# Patient Record
Sex: MALE | Race: WHITE | NOT HISPANIC OR LATINO | Employment: FULL TIME | URBAN - METROPOLITAN AREA
[De-identification: names, ages, dates, MRNs, and addresses within clinical notes are randomized per-mention and may not be internally consistent; named-entity substitution may affect disease eponyms.]

---

## 2017-01-23 ENCOUNTER — ALLSCRIPTS OFFICE VISIT (OUTPATIENT)
Dept: OTHER | Facility: OTHER | Age: 57
End: 2017-01-23

## 2018-01-12 NOTE — MISCELLANEOUS
Message  Left VM that pt needs appt at [facility] b4 pravastatin refilled so blood work can be checked        Signatures   Electronically signed by : Rocío Geronimo, ; Jun 14 2016 12:01PM EST                       (Author)

## 2018-01-14 VITALS
WEIGHT: 167.19 LBS | BODY MASS INDEX: 22.65 KG/M2 | DIASTOLIC BLOOD PRESSURE: 86 MMHG | RESPIRATION RATE: 18 BRPM | OXYGEN SATURATION: 99 % | TEMPERATURE: 97.2 F | HEART RATE: 67 BPM | SYSTOLIC BLOOD PRESSURE: 140 MMHG | HEIGHT: 72 IN

## 2018-04-26 ENCOUNTER — DOCTOR'S OFFICE (OUTPATIENT)
Dept: URBAN - METROPOLITAN AREA CLINIC 136 | Facility: CLINIC | Age: 58
Setting detail: OPHTHALMOLOGY
End: 2018-04-26
Payer: COMMERCIAL

## 2018-04-26 DIAGNOSIS — H01.004: ICD-10-CM

## 2018-04-26 DIAGNOSIS — H40.032: ICD-10-CM

## 2018-04-26 DIAGNOSIS — H16.221: ICD-10-CM

## 2018-04-26 DIAGNOSIS — H40.031: ICD-10-CM

## 2018-04-26 DIAGNOSIS — H25.11: ICD-10-CM

## 2018-04-26 DIAGNOSIS — H16.222: ICD-10-CM

## 2018-04-26 DIAGNOSIS — H01.001: ICD-10-CM

## 2018-04-26 DIAGNOSIS — C44.329: ICD-10-CM

## 2018-04-26 DIAGNOSIS — H25.12: ICD-10-CM

## 2018-04-26 PROCEDURE — 99204 OFFICE O/P NEW MOD 45 MIN: CPT | Performed by: OPHTHALMOLOGY

## 2018-04-26 PROCEDURE — 92132 CPTRZD OPH DX IMG ANT SGM: CPT | Performed by: OPHTHALMOLOGY

## 2018-04-26 ASSESSMENT — SPHEQUIV_DERIVED
OD_SPHEQUIV: 0.375
OS_SPHEQUIV: 0.875

## 2018-04-26 ASSESSMENT — REFRACTION_AUTOREFRACTION
OS_AXIS: 104
OS_CYLINDER: -1.25
OS_SPHERE: +1.50
OD_CYLINDER: -0.25
OD_SPHERE: +0.50
OD_AXIS: 023

## 2018-04-26 ASSESSMENT — REFRACTION_MANIFEST
OS_VA2: 20/
OS_VA1: 20/
OD_VA3: 20/
OD_VA1: 20/
OS_VA3: 20/
OD_VA1: 20/
OU_VA: 20/
OS_VA3: 20/
OD_VA2: 20/
OS_VA1: 20/
OS_VA2: 20/
OS_VA1: 20/
OS_VA3: 20/
OU_VA: 20/
OD_VA2: 20/
OD_VA1: 20/
OD_VA3: 20/
OS_VA2: 20/
OD_VA3: 20/
OD_VA2: 20/
OU_VA: 20/

## 2018-04-26 ASSESSMENT — SUPERFICIAL PUNCTATE KERATITIS (SPK)
OD_SPK: 1+ 2+
OS_SPK: 1+ 2+

## 2018-04-26 ASSESSMENT — VISUAL ACUITY
OD_BCVA: 20/30-2
OS_BCVA: 20/30-2

## 2018-04-26 ASSESSMENT — CONFRONTATIONAL VISUAL FIELD TEST (CVF)
OS_FINDINGS: FULL
OD_FINDINGS: FULL

## 2018-04-26 ASSESSMENT — REFRACTION_CURRENTRX
OS_OVR_VA: 20/
OD_OVR_VA: 20/
OD_OVR_VA: 20/
OS_OVR_VA: 20/
OD_OVR_VA: 20/
OS_OVR_VA: 20/

## 2018-04-26 ASSESSMENT — LID EXAM ASSESSMENTS
OS_MEIBOMITIS: 1+
OD_MEIBOMITIS: 1+
OD_BLEPHARITIS: 1+
OS_BLEPHARITIS: 1+

## 2019-10-14 ENCOUNTER — APPOINTMENT (INPATIENT)
Dept: RADIOLOGY | Facility: HOSPITAL | Age: 59
DRG: 389 | End: 2019-10-14

## 2019-10-14 ENCOUNTER — HOSPITAL ENCOUNTER (INPATIENT)
Facility: HOSPITAL | Age: 59
LOS: 6 days | Discharge: HOME | DRG: 389 | End: 2019-10-20
Attending: EMERGENCY MEDICINE | Admitting: SURGERY

## 2019-10-14 ENCOUNTER — APPOINTMENT (EMERGENCY)
Dept: CT IMAGING | Facility: HOSPITAL | Age: 59
DRG: 389 | End: 2019-10-14

## 2019-10-14 DIAGNOSIS — K56.609 SMALL BOWEL OBSTRUCTION (HCC): ICD-10-CM

## 2019-10-14 DIAGNOSIS — I10 ESSENTIAL HYPERTENSION: Primary | Chronic | ICD-10-CM

## 2019-10-14 PROBLEM — E78.5 HYPERLIPIDEMIA: Status: ACTIVE | Noted: 2019-10-14

## 2019-10-14 PROBLEM — R65.10 SIRS (SYSTEMIC INFLAMMATORY RESPONSE SYNDROME) (HCC): Status: ACTIVE | Noted: 2019-10-14

## 2019-10-14 LAB
ALBUMIN SERPL BCP-MCNC: 3.4 G/DL (ref 3.5–5)
ALBUMIN SERPL BCP-MCNC: 4 G/DL (ref 3.5–5)
ALP SERPL-CCNC: 109 U/L (ref 46–116)
ALP SERPL-CCNC: 126 U/L (ref 46–116)
ALT SERPL W P-5'-P-CCNC: 30 U/L (ref 12–78)
ALT SERPL W P-5'-P-CCNC: 38 U/L (ref 12–78)
ANION GAP SERPL CALCULATED.3IONS-SCNC: 13 MMOL/L (ref 4–13)
ANION GAP SERPL CALCULATED.3IONS-SCNC: 9 MMOL/L (ref 4–13)
AST SERPL W P-5'-P-CCNC: 20 U/L (ref 5–45)
AST SERPL W P-5'-P-CCNC: 23 U/L (ref 5–45)
BASOPHILS # BLD AUTO: 0.03 THOUSANDS/ΜL (ref 0–0.1)
BASOPHILS # BLD AUTO: 0.03 THOUSANDS/ΜL (ref 0–0.1)
BASOPHILS NFR BLD AUTO: 0 % (ref 0–1)
BASOPHILS NFR BLD AUTO: 0 % (ref 0–1)
BILIRUB SERPL-MCNC: 0.6 MG/DL (ref 0.2–1)
BILIRUB SERPL-MCNC: 1 MG/DL (ref 0.2–1)
BUN SERPL-MCNC: 19 MG/DL (ref 5–25)
BUN SERPL-MCNC: 21 MG/DL (ref 5–25)
CALCIUM SERPL-MCNC: 8.2 MG/DL (ref 8.3–10.1)
CALCIUM SERPL-MCNC: 9.4 MG/DL (ref 8.3–10.1)
CHLORIDE SERPL-SCNC: 104 MMOL/L (ref 100–108)
CHLORIDE SERPL-SCNC: 98 MMOL/L (ref 100–108)
CO2 SERPL-SCNC: 24 MMOL/L (ref 21–32)
CO2 SERPL-SCNC: 24 MMOL/L (ref 21–32)
CREAT SERPL-MCNC: 0.94 MG/DL (ref 0.6–1.3)
CREAT SERPL-MCNC: 1.24 MG/DL (ref 0.6–1.3)
EOSINOPHIL # BLD AUTO: 0.01 THOUSAND/ΜL (ref 0–0.61)
EOSINOPHIL # BLD AUTO: 0.02 THOUSAND/ΜL (ref 0–0.61)
EOSINOPHIL NFR BLD AUTO: 0 % (ref 0–6)
EOSINOPHIL NFR BLD AUTO: 0 % (ref 0–6)
ERYTHROCYTE [DISTWIDTH] IN BLOOD BY AUTOMATED COUNT: 13.2 % (ref 11.6–15.1)
ERYTHROCYTE [DISTWIDTH] IN BLOOD BY AUTOMATED COUNT: 13.2 % (ref 11.6–15.1)
GFR SERPL CREATININE-BSD FRML MDRD: 63 ML/MIN/1.73SQ M
GFR SERPL CREATININE-BSD FRML MDRD: 88 ML/MIN/1.73SQ M
GLUCOSE SERPL-MCNC: 124 MG/DL (ref 65–140)
GLUCOSE SERPL-MCNC: 154 MG/DL (ref 65–140)
HCT VFR BLD AUTO: 48.9 % (ref 36.5–49.3)
HCT VFR BLD AUTO: 53.8 % (ref 36.5–49.3)
HGB BLD-MCNC: 16.3 G/DL (ref 12–17)
HGB BLD-MCNC: 18.2 G/DL (ref 12–17)
IMM GRANULOCYTES # BLD AUTO: 0.02 THOUSAND/UL (ref 0–0.2)
IMM GRANULOCYTES # BLD AUTO: 0.03 THOUSAND/UL (ref 0–0.2)
IMM GRANULOCYTES NFR BLD AUTO: 0 % (ref 0–2)
IMM GRANULOCYTES NFR BLD AUTO: 0 % (ref 0–2)
LACTATE SERPL-SCNC: 1 MMOL/L (ref 0.5–2)
LACTATE SERPL-SCNC: 2.5 MMOL/L (ref 0.5–2)
LIPASE SERPL-CCNC: 82 U/L (ref 73–393)
LYMPHOCYTES # BLD AUTO: 0.53 THOUSANDS/ΜL (ref 0.6–4.47)
LYMPHOCYTES # BLD AUTO: 0.61 THOUSANDS/ΜL (ref 0.6–4.47)
LYMPHOCYTES NFR BLD AUTO: 7 % (ref 14–44)
LYMPHOCYTES NFR BLD AUTO: 8 % (ref 14–44)
MAGNESIUM SERPL-MCNC: 2 MG/DL (ref 1.6–2.6)
MCH RBC QN AUTO: 29.2 PG (ref 26.8–34.3)
MCH RBC QN AUTO: 29.4 PG (ref 26.8–34.3)
MCHC RBC AUTO-ENTMCNC: 33.3 G/DL (ref 31.4–37.4)
MCHC RBC AUTO-ENTMCNC: 33.8 G/DL (ref 31.4–37.4)
MCV RBC AUTO: 87 FL (ref 82–98)
MCV RBC AUTO: 88 FL (ref 82–98)
MONOCYTES # BLD AUTO: 0.79 THOUSAND/ΜL (ref 0.17–1.22)
MONOCYTES # BLD AUTO: 1.02 THOUSAND/ΜL (ref 0.17–1.22)
MONOCYTES NFR BLD AUTO: 11 % (ref 4–12)
MONOCYTES NFR BLD AUTO: 12 % (ref 4–12)
NEUTROPHILS # BLD AUTO: 5.69 THOUSANDS/ΜL (ref 1.85–7.62)
NEUTROPHILS # BLD AUTO: 6.68 THOUSANDS/ΜL (ref 1.85–7.62)
NEUTS SEG NFR BLD AUTO: 81 % (ref 43–75)
NEUTS SEG NFR BLD AUTO: 81 % (ref 43–75)
NRBC BLD AUTO-RTO: 0 /100 WBCS
NRBC BLD AUTO-RTO: 0 /100 WBCS
PHOSPHATE SERPL-MCNC: 2.9 MG/DL (ref 2.7–4.5)
PLATELET # BLD AUTO: 197 THOUSANDS/UL (ref 149–390)
PLATELET # BLD AUTO: 218 THOUSANDS/UL (ref 149–390)
PMV BLD AUTO: 9.8 FL (ref 8.9–12.7)
PMV BLD AUTO: 9.9 FL (ref 8.9–12.7)
POTASSIUM SERPL-SCNC: 3.9 MMOL/L (ref 3.5–5.3)
POTASSIUM SERPL-SCNC: 4 MMOL/L (ref 3.5–5.3)
PROCALCITONIN SERPL-MCNC: 0.25 NG/ML
PROT SERPL-MCNC: 7 G/DL (ref 6.4–8.2)
PROT SERPL-MCNC: 8.4 G/DL (ref 6.4–8.2)
RBC # BLD AUTO: 5.58 MILLION/UL (ref 3.88–5.62)
RBC # BLD AUTO: 6.18 MILLION/UL (ref 3.88–5.62)
SODIUM SERPL-SCNC: 135 MMOL/L (ref 136–145)
SODIUM SERPL-SCNC: 137 MMOL/L (ref 136–145)
WBC # BLD AUTO: 7.07 THOUSAND/UL (ref 4.31–10.16)
WBC # BLD AUTO: 8.39 THOUSAND/UL (ref 4.31–10.16)

## 2019-10-14 PROCEDURE — 96361 HYDRATE IV INFUSION ADD-ON: CPT

## 2019-10-14 PROCEDURE — 36415 COLL VENOUS BLD VENIPUNCTURE: CPT | Performed by: PHYSICIAN ASSISTANT

## 2019-10-14 PROCEDURE — 96375 TX/PRO/DX INJ NEW DRUG ADDON: CPT

## 2019-10-14 PROCEDURE — 87040 BLOOD CULTURE FOR BACTERIA: CPT | Performed by: PHYSICIAN ASSISTANT

## 2019-10-14 PROCEDURE — 74022 RADEX COMPL AQT ABD SERIES: CPT

## 2019-10-14 PROCEDURE — 83605 ASSAY OF LACTIC ACID: CPT | Performed by: PHYSICIAN ASSISTANT

## 2019-10-14 PROCEDURE — 99254 IP/OBS CNSLTJ NEW/EST MOD 60: CPT | Performed by: INTERNAL MEDICINE

## 2019-10-14 PROCEDURE — 99283 EMERGENCY DEPT VISIT LOW MDM: CPT | Performed by: PHYSICIAN ASSISTANT

## 2019-10-14 PROCEDURE — 85025 COMPLETE CBC W/AUTO DIFF WBC: CPT | Performed by: SURGERY

## 2019-10-14 PROCEDURE — 80053 COMPREHEN METABOLIC PANEL: CPT | Performed by: SURGERY

## 2019-10-14 PROCEDURE — 90682 RIV4 VACC RECOMBINANT DNA IM: CPT | Performed by: PHYSICIAN ASSISTANT

## 2019-10-14 PROCEDURE — 83690 ASSAY OF LIPASE: CPT | Performed by: PHYSICIAN ASSISTANT

## 2019-10-14 PROCEDURE — 74177 CT ABD & PELVIS W/CONTRAST: CPT

## 2019-10-14 PROCEDURE — 84145 PROCALCITONIN (PCT): CPT | Performed by: INTERNAL MEDICINE

## 2019-10-14 PROCEDURE — 80053 COMPREHEN METABOLIC PANEL: CPT | Performed by: PHYSICIAN ASSISTANT

## 2019-10-14 PROCEDURE — 99285 EMERGENCY DEPT VISIT HI MDM: CPT

## 2019-10-14 PROCEDURE — 83735 ASSAY OF MAGNESIUM: CPT | Performed by: SURGERY

## 2019-10-14 PROCEDURE — 99223 1ST HOSP IP/OBS HIGH 75: CPT | Performed by: SURGERY

## 2019-10-14 PROCEDURE — 84100 ASSAY OF PHOSPHORUS: CPT | Performed by: SURGERY

## 2019-10-14 PROCEDURE — 85025 COMPLETE CBC W/AUTO DIFF WBC: CPT | Performed by: PHYSICIAN ASSISTANT

## 2019-10-14 PROCEDURE — 96374 THER/PROPH/DIAG INJ IV PUSH: CPT

## 2019-10-14 RX ORDER — METOPROLOL TARTRATE 5 MG/5ML
5 INJECTION INTRAVENOUS EVERY 6 HOURS PRN
Status: DISCONTINUED | OUTPATIENT
Start: 2019-10-14 | End: 2019-10-20 | Stop reason: HOSPADM

## 2019-10-14 RX ORDER — SODIUM CHLORIDE 9 MG/ML
125 INJECTION, SOLUTION INTRAVENOUS CONTINUOUS
Status: DISCONTINUED | OUTPATIENT
Start: 2019-10-14 | End: 2019-10-15

## 2019-10-14 RX ORDER — ONDANSETRON 2 MG/ML
4 INJECTION INTRAMUSCULAR; INTRAVENOUS ONCE
Status: COMPLETED | OUTPATIENT
Start: 2019-10-14 | End: 2019-10-14

## 2019-10-14 RX ORDER — LISINOPRIL 20 MG/1
1 TABLET ORAL DAILY
COMMUNITY

## 2019-10-14 RX ORDER — ONDANSETRON 2 MG/ML
4 INJECTION INTRAMUSCULAR; INTRAVENOUS EVERY 6 HOURS PRN
Status: DISCONTINUED | OUTPATIENT
Start: 2019-10-14 | End: 2019-10-20 | Stop reason: HOSPADM

## 2019-10-14 RX ORDER — HEPARIN SODIUM 5000 [USP'U]/ML
5000 INJECTION, SOLUTION INTRAVENOUS; SUBCUTANEOUS EVERY 8 HOURS SCHEDULED
Status: DISCONTINUED | OUTPATIENT
Start: 2019-10-14 | End: 2019-10-20 | Stop reason: HOSPADM

## 2019-10-14 RX ORDER — PRAVASTATIN SODIUM 40 MG
1 TABLET ORAL
COMMUNITY
Start: 2015-12-17

## 2019-10-14 RX ORDER — CEFAZOLIN SODIUM 2 G/50ML
2000 SOLUTION INTRAVENOUS EVERY 8 HOURS
Status: DISCONTINUED | OUTPATIENT
Start: 2019-10-14 | End: 2019-10-16

## 2019-10-14 RX ADMIN — SODIUM CHLORIDE 1000 ML: 0.9 INJECTION, SOLUTION INTRAVENOUS at 00:54

## 2019-10-14 RX ADMIN — HEPARIN SODIUM 5000 UNITS: 5000 INJECTION INTRAVENOUS; SUBCUTANEOUS at 14:31

## 2019-10-14 RX ADMIN — IOHEXOL 100 ML: 350 INJECTION, SOLUTION INTRAVENOUS at 02:09

## 2019-10-14 RX ADMIN — SODIUM CHLORIDE 125 ML/HR: 0.9 INJECTION, SOLUTION INTRAVENOUS at 21:46

## 2019-10-14 RX ADMIN — FAMOTIDINE 20 MG: 10 INJECTION, SOLUTION INTRAVENOUS at 18:51

## 2019-10-14 RX ADMIN — HEPARIN SODIUM 5000 UNITS: 5000 INJECTION INTRAVENOUS; SUBCUTANEOUS at 05:39

## 2019-10-14 RX ADMIN — HEPARIN SODIUM 5000 UNITS: 5000 INJECTION INTRAVENOUS; SUBCUTANEOUS at 21:18

## 2019-10-14 RX ADMIN — ONDANSETRON 4 MG: 2 INJECTION INTRAMUSCULAR; INTRAVENOUS at 00:50

## 2019-10-14 RX ADMIN — FAMOTIDINE 20 MG: 10 INJECTION, SOLUTION INTRAVENOUS at 09:31

## 2019-10-14 RX ADMIN — CEFAZOLIN SODIUM 2000 MG: 2 SOLUTION INTRAVENOUS at 12:10

## 2019-10-14 RX ADMIN — METOPROLOL TARTRATE 5 MG: 5 INJECTION INTRAVENOUS at 16:04

## 2019-10-14 RX ADMIN — SODIUM CHLORIDE 1000 ML: 0.9 INJECTION, SOLUTION INTRAVENOUS at 02:59

## 2019-10-14 RX ADMIN — CEFAZOLIN SODIUM 2000 MG: 2 SOLUTION INTRAVENOUS at 21:18

## 2019-10-14 RX ADMIN — CEFAZOLIN SODIUM 2000 MG: 2 SOLUTION INTRAVENOUS at 04:50

## 2019-10-14 RX ADMIN — INFLUENZA A VIRUS A/BRISBANE/02/2018 (H1N1) RECOMBINANT HEMAGGLUTININ ANTIGEN, INFLUENZA A VIRUS A/KANSAS/14/2017 (H3N2) RECOMBINANT HEMAGGLUTININ ANTIGEN, INFLUENZA B VIRUS B/PHUKET/3073/2013 RECOMBINANT HEMAGGLUTININ ANTIGEN, AND INFLUENZA B VIRUS B/MARYLAND/15/2016 RECOMBINANT HEMAGGLUTININ ANTIGEN 0.5 ML: 45; 45; 45; 45 INJECTION INTRAMUSCULAR at 06:35

## 2019-10-14 RX ADMIN — MORPHINE SULFATE 2 MG: 2 INJECTION, SOLUTION INTRAMUSCULAR; INTRAVENOUS at 03:03

## 2019-10-14 RX ADMIN — SODIUM CHLORIDE 125 ML/HR: 0.9 INJECTION, SOLUTION INTRAVENOUS at 04:36

## 2019-10-14 RX ADMIN — ONDANSETRON 4 MG: 2 INJECTION INTRAMUSCULAR; INTRAVENOUS at 05:28

## 2019-10-14 NOTE — QUICK NOTE
Surgical Rounds - General Surgery   Ben Hanna 61 y o  male MRN: [id number]  Unit/Bed#: ED 21 Encounter: [id number]      Daily Surgical Rounds    Patient seen and assessed by Dr Watson Roldan, and surgical team at 10:45        Cici Echevarria PA-C  10/14/2019

## 2019-10-14 NOTE — SOCIAL WORK
Cm met with patient at bedside, patient alert and oriented and accompanied by his son Krysta Walters  Patient reports residing with his son Krysta Walters in a private home  Cm informed patient is completely independent with his care, no dme use, was laid off and looking for employment  Patient reported meeting with BERT in the ED  Patient reports having some savings that he uses for his current medications  Patient reports filling his prescriptions at [facility], last pharmacy used was [address]  Patient denies any MH/SA or home o2  Patient mentioned he does not have an official POA but reports his son Amado Mccormick or Krysta Walters are able to make medical decisions on his behalf if needed  Patient mentioned being able to drive, reports having a new doctor in [place] but forgets the physicians name  CM reviewed discharge planning process including the following: identifying help at home, patient preference for discharge planning needs, pharmacy preference, and availability of treatment team to discuss questions or concerns patient and/or family may have regarding understanding medications and recognizing signs and symptoms once discharged  CM also encouraged patient to follow up with all recommended appointments after discharge  Patient advised of importance for patient and family to participate in managing patients medical well being

## 2019-10-14 NOTE — ED PROVIDER NOTES
History  Chief Complaint   Patient presents with    Vomiting     Pt c/o vomiting starting tonight and lower abdominal pain  Pt states having some diarrhea yesterday  Patient is a 70-year-old male presents to emergency department with complaints of vomiting, abdominal pain  He states that he had 1 episode of diarrhea yesterday  Patient states that beginning tonight he had sudden onset of vomiting with abdominal pain  Patient states he has had a cholecystectomy in the past that was performed laparoscopically  Patient denies fever, chills  None       Past Medical History:   Diagnosis Date    Hyperlipidemia     Hypertension        Past Surgical History:   Procedure Laterality Date    CHOLECYSTECTOMY         History reviewed  No pertinent family history  I have reviewed and agree with the history as documented  Social History     Tobacco Use    Smoking status: Never Smoker    Smokeless tobacco: Never Used   Substance Use Topics    Alcohol use: Not Currently     Frequency: Never    Drug use: Never        Review of Systems   Constitutional: Negative for fever  Gastrointestinal: Positive for abdominal pain, nausea and vomiting  Physical Exam  Physical Exam   Constitutional: He appears well-developed and well-nourished  HENT:   Head: Normocephalic and atraumatic  Right Ear: External ear normal    Left Ear: External ear normal    Nose: Nose normal    Mouth/Throat: Oropharynx is clear and moist    Eyes: Pupils are equal, round, and reactive to light  Conjunctivae and EOM are normal    Neck: Normal range of motion  Cardiovascular: Normal rate, regular rhythm and normal heart sounds  Pulmonary/Chest: Effort normal and breath sounds normal    Abdominal: Soft  Bowel sounds are normal  There is tenderness  Vitals reviewed        Vital Signs  ED Triage Vitals [10/14/19 0040]   Temperature Pulse Respirations Blood Pressure SpO2   97 6 °F (36 4 °C) (!) 117 22 (!) 176/118 97 %      Temp Source Heart Rate Source Patient Position - Orthostatic VS BP Location FiO2 (%)   Oral Monitor Lying Right arm --      Pain Score       Worst Possible Pain           Vitals:    10/14/19 0040 10/14/19 0300 10/14/19 0330   BP: (!) 176/118 (!) 185/112 154/91   Pulse: (!) 117 (!) 106 97   Patient Position - Orthostatic VS: Lying Lying Lying         Visual Acuity      ED Medications  Medications   sodium chloride 0 9 % infusion (125 mL/hr Intravenous New Bag 10/14/19 0436)   ondansetron (ZOFRAN) injection 4 mg (has no administration in time range)   heparin (porcine) subcutaneous injection 5,000 Units (has no administration in time range)   ceFAZolin (ANCEF) IVPB (premix) 2,000 mg (has no administration in time range)   famotidine (PEPCID) injection 20 mg (has no administration in time range)   morphine injection 2 mg (has no administration in time range)   phenol (CHLORASEPTIC) 1 4 % mucosal liquid 1 spray (has no administration in time range)   sodium chloride 0 9 % bolus 1,000 mL (0 mL Intravenous Stopped 10/14/19 0253)   ondansetron (ZOFRAN) injection 4 mg (4 mg Intravenous Given 10/14/19 0050)   iohexol (OMNIPAQUE) 350 MG/ML injection (MULTI-DOSE) 100 mL (100 mL Intravenous Given 10/14/19 0209)   sodium chloride 0 9 % bolus 1,000 mL (0 mL Intravenous Stopped 10/14/19 0436)   morphine injection 2 mg (2 mg Intravenous Given 10/14/19 0303)       Diagnostic Studies  Results Reviewed     Procedure Component Value Units Date/Time    Lipase [[id number]]  (Normal) Collected:  10/14/19 0054    Lab Status:  Final result Specimen:  Blood from Arm, Right Updated:  10/14/19 0409     Lipase 82 u/L     CBC and differential [[id number]]     Lab Status:  No result Specimen:  Blood     Comprehensive metabolic panel [[id number]]     Lab Status:  No result Specimen:  Blood     Magnesium [[id number]]     Lab Status:  No result Specimen:  Blood     Phosphorus [[id number]]     Lab Status:  No result Specimen:  Blood     Platelet count [[id number]]     Lab Status:  No result Specimen:  Blood     Blood culture [[id number]] Collected:  10/14/19 0255    Lab Status: In process Specimen:  Blood from Arm, Right Updated:  10/14/19 0258    Blood culture [[id number]] Collected:  10/14/19 0253    Lab Status: In process Specimen:  Blood from Arm, Left Updated:  10/14/19 0258    UA w Reflex to Microscopic w Reflex to Culture [[id number]]     Lab Status:  No result Specimen:  Urine     Lactic acid, plasma [[id number]]  (Abnormal) Collected:  10/14/19 0054    Lab Status:  Final result Specimen:  Blood from Arm, Right Updated:  10/14/19 0121     LACTIC ACID 2 5 mmol/L     Narrative:       Result may be elevated if tourniquet was used during collection      Comprehensive metabolic panel [[id number]]  (Abnormal) Collected:  10/14/19 0054    Lab Status:  Final result Specimen:  Blood from Arm, Right Updated:  10/14/19 0117     Sodium 135 mmol/L      Potassium 3 9 mmol/L      Chloride 98 mmol/L      CO2 24 mmol/L      ANION GAP 13 mmol/L      BUN 21 mg/dL      Creatinine 1 24 mg/dL      Glucose 154 mg/dL      Calcium 9 4 mg/dL      AST 23 U/L      ALT 38 U/L      Alkaline Phosphatase 126 U/L      Total Protein 8 4 g/dL      Albumin 4 0 g/dL      Total Bilirubin 1 00 mg/dL      eGFR 63 ml/min/1 73sq m     Narrative:       [place] guidelines for Chronic Kidney Disease (CKD):     Stage 1 with normal or high GFR (GFR > 90 mL/min/1 73 square meters)    Stage 2 Mild CKD (GFR = 60-89 mL/min/1 73 square meters)    Stage 3A Moderate CKD (GFR = 45-59 mL/min/1 73 square meters)    Stage 3B Moderate CKD (GFR = 30-44 mL/min/1 73 square meters)    Stage 4 Severe CKD (GFR = 15-29 mL/min/1 73 square meters)    Stage 5 End Stage CKD (GFR <15 mL/min/1 73 square meters)  Note: GFR calculation is accurate only with a steady state creatinine    CBC and differential [[id number]]  (Abnormal) Collected:  10/14/19 0054    Lab Status:  Final result Specimen:  Blood from Arm, Right Updated:  10/14/19 0100     WBC 8 39 Thousand/uL      RBC 6 18 Million/uL      Hemoglobin 18 2 g/dL      Hematocrit 53 8 %      MCV 87 fL      MCH 29 4 pg      MCHC 33 8 g/dL      RDW 13 2 %      MPV 9 9 fL      Platelets 108 Thousands/uL      nRBC 0 /100 WBCs      Neutrophils Relative 81 %      Immat GRANS % 0 %      Lymphocytes Relative 7 %      Monocytes Relative 12 %      Eosinophils Relative 0 %      Basophils Relative 0 %      Neutrophils Absolute 6 68 Thousands/µL      Immature Grans Absolute 0 03 Thousand/uL      Lymphocytes Absolute 0 61 Thousands/µL      Monocytes Absolute 1 02 Thousand/µL      Eosinophils Absolute 0 02 Thousand/µL      Basophils Absolute 0 03 Thousands/µL                  CT abdomen pelvis w contrast    (Results Pending)   XR abdomen obstruction series    (Results Pending)              Procedures  Procedures       ED Course                               MDM  Number of Diagnoses or Management Options  Small bowel obstruction [facility]):   Diagnosis management comments: Patient is a 26-year-old male presents emergency department with complaints of nausea, vomiting, abdominal pain  Lab evaluation was performed does show elevated lactic acid 2 5  During lab work was reviewed and is noncontributory  CT scan abdomen pelvis was reviewed and does show evidence of small loop bowel obstruction  Findings were discussed Dr Mariela Cunningham    Patient be admitted to the hospital for [facility] by the       Amount and/or Complexity of Data Reviewed  Clinical lab tests: ordered  Tests in the radiology section of CPT®: ordered  Independent visualization of images, tracings, or specimens: yes    Risk of Complications, Morbidity, and/or Mortality  Presenting problems: moderate  Diagnostic procedures: moderate  Management options: moderate    Patient Progress  Patient progress: stable      Disposition  Final diagnoses:   Small bowel obstruction ([facility] [address] )     Time reflects when diagnosis was documented in both MDM as applicable and the Disposition within this note     Time User Action Codes Description Comment    10/14/2019  3:30 AM Tun[place], [address] Essential hypertension     10/14/2019  3:30 AM Arlene Griffiths 98 Essential hypertension     10/14/2019  3:30 AM Kasandra Gonzalez Modify [I10] Essential hypertension     10/14/2019  3:41 AM Ankur Reji Add [S24 730] Small bowel obstruction [facility])       ED Disposition     ED Disposition Condition Date/Time Comment    Admit Stable Mon Oct 14, 2019  3:40 AM Case was discussed with Dr Alin Bell and the patient's admission status was agreed to be Admission Status: inpatient status to the service of Dr Alin Bell  Follow-up Information    None         Patient's Medications    No medications on file     No discharge procedures on file      ED Provider  Electronically Signed by           Ashley Gomez PA-C  10/14/19 1039

## 2019-10-14 NOTE — LETTER
[address]  [address] [address]  No information on file  October 20, 2019     Patient: Cosmo Tinoco   YOB: 1960   Date of Visit: 10/14/2019       To Whom it May Concern:    Connie Billy is under my professional care  He was seen in the hospital from 10/14/2019   to 10/20/19  He may return to work as early as 10/21/19 but with limited duty and limited hours until he is cleared by the surgery office       If you have any questions or concerns, please don't hesitate to call           Sincerely,          Jack Cruz PA-C

## 2019-10-14 NOTE — PLAN OF CARE
Problem: Potential for Falls  Goal: Patient will remain free of falls  Description  INTERVENTIONS:  - Assess patient frequently for physical needs  -  Identify cognitive and physical deficits and behaviors that affect risk of falls    -  [place] fall precautions as indicated by assessment   - Educate patient/family on patient safety including physical limitations  - Instruct patient to call for assistance with activity based on assessment  - Modify environment to reduce risk of injury  - Consider OT/PT consult to assist with strengthening/mobility  Outcome: Progressing     Problem: PAIN - ADULT  Goal: Verbalizes/displays adequate comfort level or baseline comfort level  Description  Interventions:  - Encourage patient to monitor pain and request assistance  - Assess pain using appropriate pain scale  - Administer analgesics based on type and severity of pain and evaluate response  - Implement non-pharmacological measures as appropriate and evaluate response  - Consider cultural and social influences on pain and pain management  - Notify physician/advanced practitioner if interventions unsuccessful or patient reports new pain  Outcome: Progressing     Problem: INFECTION - ADULT  Goal: Absence or prevention of progression during hospitalization  Description  INTERVENTIONS:  - Assess and monitor for signs and symptoms of infection  - Monitor lab/diagnostic results  - Monitor all insertion sites, i e  indwelling lines, tubes, and drains  - Monitor endotracheal if appropriate and nasal secretions for changes in amount and color  - [place] appropriate cooling/warming therapies per order  - Administer medications as ordered  - Instruct and encourage patient and family to use good hand hygiene technique  - Identify and instruct in appropriate isolation precautions for identified infection/condition  Outcome: Progressing  Goal: Absence of fever/infection during neutropenic period  Description  INTERVENTIONS:  - Monitor WBC    Outcome: Progressing     Problem: SAFETY ADULT  Goal: Maintain or return to baseline ADL function  Description  INTERVENTIONS:  -  Assess patient's ability to carry out ADLs; assess patient's baseline for ADL function and identify physical deficits which impact ability to perform ADLs (bathing, care of mouth/teeth, toileting, grooming, dressing, etc )  - Assess/evaluate cause of self-care deficits   - Assess range of motion  - Assess patient's mobility; develop plan if impaired  - Assess patient's need for assistive devices and provide as appropriate  - Encourage maximum independence but intervene and supervise when necessary  - Involve family in performance of ADLs  - Assess for home care needs following discharge   - Consider OT consult to assist with ADL evaluation and planning for discharge  - Provide patient education as appropriate  Outcome: Progressing  Goal: Maintain or return mobility status to optimal level  Description  INTERVENTIONS:  - Assess patient's baseline mobility status (ambulation, transfers, stairs, etc )    - Identify cognitive and physical deficits and behaviors that affect mobility  - Identify mobility aids required to assist with transfers and/or ambulation (gait belt, sit-to-stand, lift, walker, cane, etc )  - [place] fall precautions as indicated by assessment  - Record patient progress and toleration of activity level on Mobility SBAR; progress patient to next Phase/Stage  - Instruct patient to call for assistance with activity based on assessment  - Consider rehabilitation consult to assist with strengthening/weightbearing, etc   Outcome: Progressing     Problem: DISCHARGE PLANNING  Goal: Discharge to home or other facility with appropriate resources  Description  INTERVENTIONS:  - Identify barriers to discharge w/patient and caregiver  - Arrange for needed discharge resources and transportation as appropriate  - Identify discharge learning needs (meds, wound care, etc )  - Arrange for interpretive services to assist at discharge as needed  - Refer to Case Management Department for coordinating discharge planning if the patient needs post-hospital services based on physician/advanced practitioner order or complex needs related to functional status, cognitive ability, or social support system  Outcome: Progressing     Problem: Knowledge Deficit  Goal: Patient/family/caregiver demonstrates understanding of disease process, treatment plan, medications, and discharge instructions  Description  Complete learning assessment and assess knowledge base    Interventions:  - Provide teaching at level of understanding  - Provide teaching via preferred learning methods  Outcome: Progressing

## 2019-10-14 NOTE — ED NOTES
Patient transported to [address] [place] [address], [address] [place] [address]  10/14/19 0271

## 2019-10-14 NOTE — ASSESSMENT & PLAN NOTE
- management per primary service (general surgery)  - remains NPO  - initially noted on CT imaging confirmed on subsequent XR obstruction series

## 2019-10-14 NOTE — ASSESSMENT & PLAN NOTE
- hold oral antihypertensives while NPO  - PRN IV Lopressor on board  - optimize pain control - low-sodium restriction once back on oral diet

## 2019-10-14 NOTE — APP STUDENT NOTE
PEDRO STUDENT  Inpatient Progress Note for TRAINING ONLY  Not Part of Legal Medical Record       Progress Note - Fermin Sidhu 61 y o  male MRN: [id number]    Unit/Bed#: ED 21 Encounter: [id number]      Assessment:  Small Bowel Obstruction vs  Enteritis  Pt presented to the ED last night with abdominal pain, nausea and vomiting  States that the pain began after eating dinner last night but he had diarrhea for most of the day  Pain continued to worsen and he vomited 3 times  Initial labs showed a lactic acid of 2 5 and Hgb 18 2 consistent with dehydration  CT scan indicated a "high-grade small bowel obstruction" with a transition point identified in the LLQ and twisting of the mesentery  An obstruction series was performed prior to NG tube placement and impression was consistent with CT  Plan:  Continue NPO  Continue NG decompression  IVF  Repeat obstruction series tomorrow morning     Subjective:   CC: I had diarrhea all day yesterday and severe abdominal pain after eating dinner  Vomited 3 times    HPI: Pt is a 61year old male with a PMHx of HTN and HLD  He presented to the ED last night with lower abdominal pain, LLQ > RLQ  He admits to diarrhea most of the day but denies any hematochezia or melena  He states the abdominal pain, nausea and vomiting started shortly after eating dinner and symptoms progressively worsened  He denies any hematemesis or coffee ground vomit  He denies any unusual food intake, he states that he only had a "soft pretzel and bottle of tea from [employer]" during the day  No one else in the home had any of the same symptoms  His past surgical history is significant for laparoscopic cholecystectomy  ROS negative except as noted in HPI    Objective:     Vitals: Blood pressure 152/97, pulse 95, temperature 98 °F (36 7 °C), temperature source Oral, resp  rate 16, weight 71 9 kg (158 lb 8 2 oz), SpO2 96 %  ,Body mass index is 21 5 kg/m²        Intake/Output Summary (Last 24 hours) at 10/14/2019 1223  Last data filed at 10/14/2019 0800  Gross per 24 hour   Intake 1125 ml   Output 1200 ml   Net -75 ml       Physical Exam:    General: A & O x 3, no acute distress, appears stated age  [de-identified]: NG tube  CV: RRR, no murmurs, rubs or gallops  Resp: Lungs CTA, no wheezes, ronchi, or rales  Abdomen: Hypoactive bowel sounds, mild distension, abdomen soft without guarding, mild LLQ tenderness  Psych: appropriate mood/affect      Invasive Devices     Peripheral Intravenous Line            Peripheral IV 10/14/19 Right Antecubital less than 1 day          Drain            NG/OG/Enteral Tube -- days    NG/OG/Enteral Tube Nasogastric 14 Fr Right nares less than 1 day                Lab, Imaging and other studies: I have personally reviewed pertinent reports  Radiology Results (last 21 days)     Procedure Component Value Units Date/Time   RADIOLOGY RESULTS [[id number]] Resulted: 10/14/19 1308   Order Status: Completed Updated: 10/14/19 1308   XR abdomen obstruction series [[id number]] Collected: 10/14/19 1008   Order Status: Completed Updated: 10/14/19 1011   Narrative:     OBSTRUCTION SERIES    INDICATION:   bowel obstruction  COMPARISON:  CT from the same day  EXAM PERFORMED/VIEWS:  XR ABDOMEN OBSTRUCTION SERIES      FINDINGS:    There are diffusely distended loops of small bowel compatible with bowel obstruction and similar to the prior examination   There is no gross intra-abdominal free air  No free air beneath the hemidiaphragms  No pathologic calcifications or soft tissue masses evident  Osseous structures are unremarkable  Examination of the chest reveals a normal cardiomediastinal silhouette  Lungs are clear  Impression:       Redemonstration of a high-grade small bowel obstruction        Workstation performed: [id number]   CT abdomen pelvis w contrast [[id number]] Collected: 10/14/19 1666   Order Status: Completed Updated: 10/14/19 9648   Narrative:     CT ABDOMEN AND PELVIS WITH IV CONTRAST    INDICATION:   Abdominal pain, acute, nonlocalized  COMPARISON:  None  TECHNIQUE:  CT examination of the abdomen and pelvis was performed  Axial, sagittal, and coronal 2D reformatted images were created from the source data and submitted for interpretation  Radiation dose length product (DLP) for this visit: [id number] mGy-cm    This examination, like all CT scans performed in the [facility], was performed utilizing techniques to minimize radiation dose exposure, including the use of iterative   reconstruction and automated exposure control  IV Contrast:  100 mL of iohexol (OMNIPAQUE)  Enteric Contrast:  Enteric contrast was not administered  FINDINGS:    ABDOMEN    LOWER CHEST:  Few scattered pulmonary blebs/cysts   2 mm nodular density in the right lower lobe posteriorly, nonspecific  LIVER/BILIARY TREE:  Unremarkable  GALLBLADDER: Max Bending is surgically absent  1    SPLEEN:  Unremarkable  PANCREAS:  Unremarkable  ADRENAL GLANDS:  Unremarkable  KIDNEYS/URETERS:  No hydronephrosis   Bilateral renal cortical scarring   Subcentimeter hypodensity in the left renal upper pole, likely a small cyst     STOMACH AND BOWEL:  Multiple dilated small bowel loops measuring up to 4 5 cm in maximal diameter   The transition point is identified in the left lower quadrant on image 67 of series 2   There is twisting of the mesentery and equalization of some small   bowel loops   No definite evidence of pneumatosis intestinalis  APPENDIX:  No findings to suggest appendicitis  ABDOMINOPELVIC CAVITY:  Small amount of ascites in the abdomen and pelvis   No free intraperitoneal air  No lymphadenopathy  VESSELS:  Atherosclerotic changes are present   No evidence of aneurysm  PELVIS    REPRODUCTIVE ORGANS:  The prostate is enlarged  URINARY BLADDER:  Partially filled  ABDOMINAL WALL/INGUINAL REGIONS:  Unremarkable      OSSEOUS STRUCTURES:  No acute fracture or destructive osseous lesion   Mild degenerative changes in the lumbar spine  Impression:       High-grade small bowel obstruction   The transition point is identified in the left lower quadrant and there is twisting of the mesentery, raising concern for closed loop small bowel obstruction  Small amount of ascites      Findings are consistent with the preliminary report from [facility] which was provided shortly after completion of the exam           Workstation performed: [id number]       VTE Pharmacologic Prophylaxis: Heparin  VTE Mechanical Prophylaxis:

## 2019-10-14 NOTE — CONSULTS
Consult - [facility]  Patient: Nat Erazo 61 y o  male MRN: [id number]  Unit/Bed#: ED 21 Encounter: [id number]  Primary Care Provider: Vicente Lentz DO  Date Of Visit: 10/14/19        Assessment & Plan:    SIRS (systemic inflammatory response syndrome)  Assessment & Plan  - presents with tachycardia/tachypnea on admission with associated lactic acidosis - serial lactic acid level normalized with IV fluid hydration  - blood cultures drawn in the ER - in the absence of fever/leukocytosis or suspicion for obvious clinical infection, observe off antibiotics pending serum procalcitonin level  - monitor vitals and maintain hemodynamics    * Small bowel obstruction   Assessment & Plan  - management per primary service (general surgery)  - remains NPO  - initially noted on CT imaging confirmed on subsequent XR obstruction series    Essential hypertension  Assessment & Plan  - hold oral antihypertensives while NPO  - PRN IV Lopressor on board  - optimize pain control - low-sodium restriction once back on oral diet    Hyperlipidemia  Assessment & Plan  - hold statin regimen while NPO      DVT Prophylaxis:  Heparin SC    Total Time for Visit, including Counseling / Coordination of Care: 72 minutes  Greater than 50% of this total time spent on direct patient counseling and coordination of care  Reason for Consultation:  Medical management of hypertension      History of Present Illness:    Nat Erazo is a 61 y o  male who is originally admitted to the general surgery service on 10/14/2019 due to a high-grade small-bowel obstruction  The hospitalist service has been consulted for medical management of hypertension, hyperlipidemia, and systemic inflammatory response syndrome on admission  Upon my encounter in the ER, family is present at bedside and the patient is resting fairly comfortably with nasogastric decompression    He states that the symptoms started yesterday after dinner time, however, he is currently having flatus  His last bowel movement was yesterday morning described as watery in nature  He denies any prior history of small-bowel obstructions, however, does note that many years ago he did have abdominal surgery in the form of a cholecystectomy  He also notes that his pre-existing hypertension and hyperlipidemia are also chronic issues  He currently denies any nausea/vomiting  Overall, he remains in pleasant spirits  Review of Systems:    Review of Systems - A thorough 12 point review systems was conducted  Pertinent positives and negatives are mentioned in the history of present illness  Past Medical and Surgical History:     Past Medical History:   Diagnosis Date    Hyperlipidemia     Hypertension        Past Surgical History:   Procedure Laterality Date    CHOLECYSTECTOMY           Medications & Allergies:    Prior to Admission medications    Medication Sig Start Date End Date Taking? Authorizing Provider   pravastatin (PRAVACHOL) 40 mg tablet Take 1 tablet by mouth 12/17/15  Yes Historical Provider, MD   lisinopril (ZESTRIL) 20 mg tablet Take 1 tablet by mouth daily    Historical Provider, MD         Allergies: No Known Allergies      Social History:    Substance Use History:   Social History     Substance and Sexual Activity   Alcohol Use Not Currently    Frequency: Never     Social History     Tobacco Use   Smoking Status Never Smoker   Smokeless Tobacco Never Used     Social History     Substance and Sexual Activity   Drug Use Never         Family History:    Significant for emphysema in father  Significant for atrial fibrillation in mother        Physical Exam:     Vitals:   Blood Pressure: 141/76 (10/14/19 1230)  Pulse: 80 (10/14/19 1230)  Temperature: 98 °F (36 7 °C) (10/14/19 0915)  Temp Source: Oral (10/14/19 0915)  Respirations: 15 (10/14/19 1230)  Weight - Scale: 71 9 kg (158 lb 8 2 oz) (10/14/19 0040)  SpO2: 97 % (10/14/19 1230)      GENERAL: Well-developed/nourished - weak/fatigued  HEAD:  Normocephalic - atraumatic - nasogastric tube in place draining bilious fluid  EYES: PERRL - EOMI   MOUTH:  Mucosa moist  NECK:  Supple - full range of motion  CARDIAC:  Tachycardic but regular rhythm - S1/S2 positive  PULMONARY:  Clear breath sounds bilaterally - nonlabored respirations  ABDOMEN:  Soft - mildly distended without significant tenderness currently - hypoactive bowel sounds  MUSCULOSKELETAL:  Motor strength/range of motion intact  NEUROLOGIC:  Alert/oriented x 3  SKIN:  Chronic wrinkles/blemishes   PSYCHIATRIC:  Mood/affect age-appropriate      Additional Data:     Labs & Recent Cultures:    Results from last 7 days   Lab Units 10/14/19  0442   WBC Thousand/uL 7 07   HEMOGLOBIN g/dL 16 3   HEMATOCRIT % 48 9   PLATELETS Thousands/uL 197   NEUTROS PCT % 81*   LYMPHS PCT % 8*   MONOS PCT % 11   EOS PCT % 0     Results from last 7 days   Lab Units 10/14/19  0442   SODIUM mmol/L 137   POTASSIUM mmol/L 4 0   CHLORIDE mmol/L 104   CO2 mmol/L 24   BUN mg/dL 19   CREATININE mg/dL 0 94   ANION GAP mmol/L 9   CALCIUM mg/dL 8 2*   ALBUMIN g/dL 3 4*   TOTAL BILIRUBIN mg/dL 0 60   ALK PHOS U/L 109   ALT U/L 30   AST U/L 20   GLUCOSE RANDOM mg/dL 124                 Results from last 7 days   Lab Units 10/14/19  1201 10/14/19  0054   LACTIC ACID mmol/L 1 0 2 5*                 Imaging:     Xr Abdomen Obstruction Series    Result Date: 10/14/2019  Narrative: OBSTRUCTION SERIES INDICATION:   bowel obstruction  COMPARISON:  CT from the same day  EXAM PERFORMED/VIEWS:  XR ABDOMEN OBSTRUCTION SERIES FINDINGS: There are diffusely distended loops of small bowel compatible with bowel obstruction and similar to the prior examination  There is no gross intra-abdominal free air  No free air beneath the hemidiaphragms  No pathologic calcifications or soft tissue masses evident  Osseous structures are unremarkable   Examination of the chest reveals a normal cardiomediastinal silhouette  Lungs are clear  Impression: Redemonstration of a high-grade small bowel obstruction  Workstation performed: [id number]     Radiology Results    Result Date: 10/14/2019  Narrative: Ordered by an unspecified provider  Ct Abdomen Pelvis W Contrast    Result Date: 10/14/2019  Narrative: CT ABDOMEN AND PELVIS WITH IV CONTRAST INDICATION:   Abdominal pain, acute, nonlocalized  COMPARISON:  None  TECHNIQUE:  CT examination of the abdomen and pelvis was performed  Axial, sagittal, and coronal 2D reformatted images were created from the source data and submitted for interpretation  Radiation dose length product (DLP) for this visit:  534 mGy-cm   This examination, like all CT scans performed in the [facility], was performed utilizing techniques to minimize radiation dose exposure, including the use of iterative reconstruction and automated exposure control  IV Contrast:  100 mL of iohexol (OMNIPAQUE) Enteric Contrast:  Enteric contrast was not administered  FINDINGS: ABDOMEN LOWER CHEST:  Few scattered pulmonary blebs/cysts  2 mm nodular density in the right lower lobe posteriorly, nonspecific  LIVER/BILIARY TREE:  Unremarkable  GALLBLADDER:  Gallbladder is surgically absent  1 SPLEEN:  Unremarkable  PANCREAS:  Unremarkable  ADRENAL GLANDS:  Unremarkable  KIDNEYS/URETERS:  No hydronephrosis  Bilateral renal cortical scarring  Subcentimeter hypodensity in the left renal upper pole, likely a small cyst  STOMACH AND BOWEL:  Multiple dilated small bowel loops measuring up to 4 5 cm in maximal diameter  The transition point is identified in the left lower quadrant on image 67 of series 2  There is twisting of the mesentery and equalization of some small bowel loops  No definite evidence of pneumatosis intestinalis  APPENDIX:  No findings to suggest appendicitis  ABDOMINOPELVIC CAVITY:  Small amount of ascites in the abdomen and pelvis  No free intraperitoneal air  No lymphadenopathy  VESSELS:  Atherosclerotic changes are present  No evidence of aneurysm  PELVIS REPRODUCTIVE ORGANS:  The prostate is enlarged  URINARY BLADDER:  Partially filled  ABDOMINAL WALL/INGUINAL REGIONS:  Unremarkable  OSSEOUS STRUCTURES:  No acute fracture or destructive osseous lesion  Mild degenerative changes in the lumbar spine  Impression: High-grade small bowel obstruction  The transition point is identified in the left lower quadrant and there is twisting of the mesentery, raising concern for closed loop small bowel obstruction  Small amount of ascites  Findings are consistent with the preliminary report from [facility] which was provided shortly after completion of the exam  Workstation performed: [id number]           Thank you for consulting the hospitalist service for assistance in medical management of your patient  We will continue to follow through the course of the hospital stay  Seen By:  Darius Harkins MD      ** Please Note: This note is constructed using a voice recognition dictation system  An occasional wrong word/phrase or sound-a-like substitution may have been picked up by dictation device due to the inherent limitations of voice recognition software  Read the chart carefully and recognize, using reasonable context, where substitutions may have occurred  **

## 2019-10-14 NOTE — H&P
GENERAL SURGERY HISTORY AND PHYSICAL    Taina Call 61 y o  male MRN: [id number]  Unit/Bed#: ED 21 Encounter: [id number]      Assessment/Plan   Small-bowel obstruction versus enteritis  45-year-old male with history of hypertension hyperlipidemia presented last night with sudden onset of abdominal pain, nausea and vomiting, after eating dinner  Patient had had diarrhea throughout the day  Patient was afebrile on presentation with tachycardia and elevated blood pressures  Patient appeared to be dehydrated with hemoglobin of 18 2 and hematocrit of 53 8  Lactic acid was elevated at 2 5  CT scan showed diffuse distention of the large and small bowel with concern for closed loop obstruction in the left lower quadrant along with mesenteric stranding  Obstruction series were consistent with CT findings  Unfortunately, obstruction series was performed before the NG tube was placed  NG tube was placed and patient has had over  1L of output  He is feeling much better this morning and his abdomen has also become softer  He states he feels that he needs to pass flatus but has not at this time  Suspected enteritis at this time  Will continue to observe with NG tube decompression, diet NPO, and IV fluids  HTN  HLD    Plan  -- no acute surgical intervention at this time  Continue with conservative management  --  continue NG tube to low intermittent suction  -- diet NPO and IV fluids for hydration  -- serial labs and abdominal exams  -- encourage ambulation and out of bed, as tolerated  -- analgesia and antiemetics, p r n   -- DVT prophylaxis  -- repeat obstruction series tomorrow morning  -- consult [facility] for medical management  ______________________________________________________________________  Chief Complaint:  I was having around all day and then after dinner I started to feel really sick      HPI: Taina Call is a 61y o  year old male with PMHx of HTN and HLD, who presented to the emergency department last night with abdominal pain that radiated across his lower abdomen and was most prominent in the left lower quadrant  Patient states he had had diarrhea most of the day  He denies hematochezia or melena  He states he ate dinner last night and about 15-20 minutes after dinner started experiencing a sudden onset of sharp cramping abdominal pain in his lower abdomen with associated nausea and vomiting  He states he vomited 3 times at home  He denies any hematemesis or coffee-ground in the vomit  He presented to the emergency department he continued to have symptoms  Patient states that he is feeling better this morning but that he did not start to feel relief until after placement of the NG tube  Patient states the nature of his pain is changed and he is now just generally sore  Patient denies any unusual food intake  He states the only item of food he ate in the last couple days that was not from his home was a wall while soft pretzel  He denies any recent sick contacts  He did size any family members or people who ate similar to him with similar symptoms  He denies any fever or chills  He denies chest pain, palpitations or shortness of breath  His only abdominal surgical history is laparoscopic cholecystectomy      nReview of Systems   Negative except as noted in HPI    Meds/Allergies   No Known Allergies  current meds:   Current Facility-Administered Medications   Medication Dose Route Frequency    ceFAZolin (ANCEF) IVPB (premix) 2,000 mg  2,000 mg Intravenous Q8H    famotidine (PEPCID) injection 20 mg  20 mg Intravenous BID    heparin (porcine) subcutaneous injection 5,000 Units  5,000 Units Subcutaneous Q8H [facility]    morphine injection 2 mg  2 mg Intravenous Q1H PRN    ondansetron (ZOFRAN) injection 4 mg  4 mg Intravenous Q6H PRN    phenol (CHLORASEPTIC) 1 4 % mucosal liquid 1 spray  1 spray Mouth/Throat Q2H PRN    sodium chloride 0 9 % infusion  125 mL/hr Intravenous Continuous Historical Information   Past Medical History:   Diagnosis Date    Hyperlipidemia     Hypertension      Past Surgical History:   Procedure Laterality Date    CHOLECYSTECTOMY       Social History   Social History     Substance and Sexual Activity   Alcohol Use Not Currently    Frequency: Never     Social History     Substance and Sexual Activity   Drug Use Never     Social History     Tobacco Use   Smoking Status Never Smoker   Smokeless Tobacco Never Used       Family History:  Negative/unremarkable except as detailed in HPI  Objective   Vitals: /91 (BP Location: Right arm)   Pulse 97   Temp 97 6 °F (36 4 °C) (Oral)   Resp 20   Wt 71 9 kg (158 lb 8 2 oz)   SpO2 96%   BMI 21 50 kg/m² ,Body mass index is 21 5 kg/m²      Intake/Output Summary (Last 24 hours) at 10/14/2019 1143  Last data filed at 10/14/2019 0355  Gross per 24 hour   Intake 1000 ml   Output 400 ml   Net 600 ml     Invasive Devices     Peripheral Intravenous Line            Peripheral IV 10/14/19 Right Antecubital less than 1 day          Drain            NG/OG/Enteral Tube -- days    NG/OG/Enteral Tube Nasogastric 14 Fr Right nares less than 1 day                Lab Results:   CBC with diff:   Lab Results   Component Value Date    WBC 7 07 10/14/2019    HGB 16 3 10/14/2019    HCT 48 9 10/14/2019    MCV 88 10/14/2019     10/14/2019    MCH 29 2 10/14/2019    MCHC 33 3 10/14/2019    RDW 13 2 10/14/2019    MPV 9 8 10/14/2019    NRBC 0 10/14/2019   , BMP/CMP:   Lab Results   Component Value Date    SODIUM 137 10/14/2019    K 4 0 10/14/2019     10/14/2019    CO2 24 10/14/2019    BUN 19 10/14/2019    CREATININE 0 94 10/14/2019    CALCIUM 8 2 (L) 10/14/2019    AST 20 10/14/2019    ALT 30 10/14/2019    ALKPHOS 109 10/14/2019    EGFR 88 10/14/2019   , Coags: No results found for: PT, PTT, INR, Urinalysis: No results found for: Jameel Garcia, [address] [facility], LEUKOCYTESUR, NITRITE, PROTEINUA, GLUCOSEU, KETONESU, BILIRUBINUR, BLOODU    Physical Exam  Vitals: /91 (BP Location: Right arm)   Pulse 97   Temp 97 6 °F (36 4 °C) (Oral)   Resp 20   Wt 71 9 kg (158 lb 8 2 oz)   SpO2 96%   BMI 21 50 kg/m² ,Body mass index is 21 5 kg/m²  General appearance: AAO x3, no distress, appears stated age, cooperative  HEENT: PERRL, sclera clear, anicterus, oral mucosa is pink and perfused  Neck: No carotid bruits, trachea is midline    Back: no tenderness,deformity,   Lungs:clear throughout, no wheezes or rhonchi  Heart[de-identified] RRR, S1, S2 normal, no murmur  Abdomen:  Hypoactive bowel sounds, mild distention, abdomen soft without guarding, mild tenderness in the left lower quadrant  No masses  Extremities:  Warm, dry, no edema or tenderness  Skin:  Warm, dry, no rashes, no jaundice  Neurologic: CN II-XII grossly intact, no tremor, affect appropriate    Imaging Studies: Xr Abdomen Obstruction Series    Result Date: 10/14/2019  Impression: Redemonstration of a high-grade small bowel obstruction  Workstation performed: [id number]     Ct Abdomen Pelvis W Contrast    Result Date: 10/14/2019  Impression: High-grade small bowel obstruction  The transition point is identified in the left lower quadrant and there is twisting of the mesentery, raising concern for closed loop small bowel obstruction  Small amount of ascites  Findings are consistent with the preliminary report from [facility] which was provided shortly after completion of the exam  Workstation performed: [id number]     EKG, Pathology, and Other Studies: I have personally reviewed pertinent reports      VTE Prophylaxis: Sequential compression device (Venodyne)  and Heparin     Code Status: Level 1 - Full Code    Josefa James PA-C  10/14/2019

## 2019-10-14 NOTE — QUICK NOTE
Surgical Rounds - General Surgery   [facility] 61 y o  male MRN: [id number]  Unit/Bed#: ED 21 Encounter: [id number]      Daily Surgical Rounds    Patient seen and assessed by Dr Bipin Neumann, and surgical team at 10:46 AM       Hamida Muir PA-C  10/14/2019

## 2019-10-14 NOTE — ED NOTES
1  CC- small bowel obstruction   2  Is this admission due to an injury? no  3  Orientation status  Alert and oriented x3   4  Abnormal labs/focused assessment/ vitals   5  Medication/ drips ancef,  ml/hr   6  Narcotic time/ pain morphine 2 mg   7  IV lines/ drains/ etc20 R AC  8  Isolation status none  9  Skin intact  10  Ambulation status self   11   Phone number [phone]       Aleksander Fraire RN  10/14/19 9624

## 2019-10-14 NOTE — ASSESSMENT & PLAN NOTE
- presents with tachycardia/tachypnea on admission with associated lactic acidosis - serial lactic acid level normalized with IV fluid hydration  - blood cultures drawn in the ER - in the absence of fever/leukocytosis or suspicion for obvious clinical infection, observe off antibiotics pending serum procalcitonin level  - monitor vitals and maintain hemodynamics

## 2019-10-15 ENCOUNTER — APPOINTMENT (INPATIENT)
Dept: RADIOLOGY | Facility: HOSPITAL | Age: 59
DRG: 389 | End: 2019-10-15

## 2019-10-15 LAB
ANION GAP SERPL CALCULATED.3IONS-SCNC: 11 MMOL/L (ref 4–13)
BASOPHILS # BLD AUTO: 0.05 THOUSANDS/ΜL (ref 0–0.1)
BASOPHILS NFR BLD AUTO: 1 % (ref 0–1)
BUN SERPL-MCNC: 23 MG/DL (ref 5–25)
CALCIUM SERPL-MCNC: 8.1 MG/DL (ref 8.3–10.1)
CHLORIDE SERPL-SCNC: 109 MMOL/L (ref 100–108)
CO2 SERPL-SCNC: 20 MMOL/L (ref 21–32)
CREAT SERPL-MCNC: 0.97 MG/DL (ref 0.6–1.3)
EOSINOPHIL # BLD AUTO: 0.19 THOUSAND/ΜL (ref 0–0.61)
EOSINOPHIL NFR BLD AUTO: 4 % (ref 0–6)
ERYTHROCYTE [DISTWIDTH] IN BLOOD BY AUTOMATED COUNT: 13.5 % (ref 11.6–15.1)
GFR SERPL CREATININE-BSD FRML MDRD: 85 ML/MIN/1.73SQ M
GLUCOSE SERPL-MCNC: 72 MG/DL (ref 65–140)
GLUCOSE SERPL-MCNC: 77 MG/DL (ref 65–140)
HCT VFR BLD AUTO: 44.4 % (ref 36.5–49.3)
HGB BLD-MCNC: 14.6 G/DL (ref 12–17)
IMM GRANULOCYTES # BLD AUTO: 0.01 THOUSAND/UL (ref 0–0.2)
IMM GRANULOCYTES NFR BLD AUTO: 0 % (ref 0–2)
LYMPHOCYTES # BLD AUTO: 0.7 THOUSANDS/ΜL (ref 0.6–4.47)
LYMPHOCYTES NFR BLD AUTO: 13 % (ref 14–44)
MCH RBC QN AUTO: 29.2 PG (ref 26.8–34.3)
MCHC RBC AUTO-ENTMCNC: 32.9 G/DL (ref 31.4–37.4)
MCV RBC AUTO: 89 FL (ref 82–98)
MONOCYTES # BLD AUTO: 1.19 THOUSAND/ΜL (ref 0.17–1.22)
MONOCYTES NFR BLD AUTO: 22 % (ref 4–12)
NEUTROPHILS # BLD AUTO: 3.28 THOUSANDS/ΜL (ref 1.85–7.62)
NEUTS SEG NFR BLD AUTO: 60 % (ref 43–75)
NRBC BLD AUTO-RTO: 0 /100 WBCS
PLATELET # BLD AUTO: 181 THOUSANDS/UL (ref 149–390)
PMV BLD AUTO: 10 FL (ref 8.9–12.7)
POTASSIUM SERPL-SCNC: 4 MMOL/L (ref 3.5–5.3)
RBC # BLD AUTO: 5 MILLION/UL (ref 3.88–5.62)
SODIUM SERPL-SCNC: 140 MMOL/L (ref 136–145)
WBC # BLD AUTO: 5.42 THOUSAND/UL (ref 4.31–10.16)

## 2019-10-15 PROCEDURE — 80048 BASIC METABOLIC PNL TOTAL CA: CPT | Performed by: PHYSICIAN ASSISTANT

## 2019-10-15 PROCEDURE — 99233 SBSQ HOSP IP/OBS HIGH 50: CPT | Performed by: SURGERY

## 2019-10-15 PROCEDURE — 82948 REAGENT STRIP/BLOOD GLUCOSE: CPT

## 2019-10-15 PROCEDURE — 85025 COMPLETE CBC W/AUTO DIFF WBC: CPT | Performed by: PHYSICIAN ASSISTANT

## 2019-10-15 PROCEDURE — 74022 RADEX COMPL AQT ABD SERIES: CPT

## 2019-10-15 PROCEDURE — 99232 SBSQ HOSP IP/OBS MODERATE 35: CPT | Performed by: NURSE PRACTITIONER

## 2019-10-15 RX ADMIN — CEFAZOLIN SODIUM 2000 MG: 2 SOLUTION INTRAVENOUS at 14:06

## 2019-10-15 RX ADMIN — CEFAZOLIN SODIUM 2000 MG: 2 SOLUTION INTRAVENOUS at 21:44

## 2019-10-15 RX ADMIN — CEFAZOLIN SODIUM 2000 MG: 2 SOLUTION INTRAVENOUS at 05:01

## 2019-10-15 RX ADMIN — HEPARIN SODIUM 5000 UNITS: 5000 INJECTION INTRAVENOUS; SUBCUTANEOUS at 05:01

## 2019-10-15 RX ADMIN — SODIUM CHLORIDE 125 ML/HR: 0.9 INJECTION, SOLUTION INTRAVENOUS at 10:15

## 2019-10-15 RX ADMIN — FAMOTIDINE 20 MG: 10 INJECTION, SOLUTION INTRAVENOUS at 16:37

## 2019-10-15 RX ADMIN — HEPARIN SODIUM 5000 UNITS: 5000 INJECTION INTRAVENOUS; SUBCUTANEOUS at 14:06

## 2019-10-15 RX ADMIN — HEPARIN SODIUM 5000 UNITS: 5000 INJECTION INTRAVENOUS; SUBCUTANEOUS at 21:44

## 2019-10-15 RX ADMIN — FAMOTIDINE 20 MG: 10 INJECTION, SOLUTION INTRAVENOUS at 10:13

## 2019-10-15 NOTE — UTILIZATION REVIEW
Initial Clinical Review    Admission: Date/Time/Statement: Inpatient Admission Orders (From admission, onward)     Ordered        10/14/19 0334  Inpatient Admission  Once                   Orders Placed This Encounter   Procedures    Inpatient Admission     Standing Status:   Standing     Number of Occurrences:   1     Order Specific Question:   Admitting Physician     Answer:   Remigio Kuhn     Order Specific Question:   Level of Care     Answer:   Med Surg [16]     Order Specific Question:   Estimated length of stay     Answer:   More than 2 Midnights     Order Specific Question:   Certification     Answer:   I certify that inpatient services are medically necessary for this patient for a duration of greater than two midnights  See H&P and MD Progress Notes for additional information about the patient's course of treatment  ED Arrival Information     Expected Arrival Acuity Means of Arrival Escorted By Service Admission Type    - 10/14/2019 00:32 Urgent Walk-In Family [facility] Surgery-General Urgent    Arrival Complaint    Vomitting        Chief Complaint   Patient presents with    Vomiting     Pt c/o vomiting starting tonight and lower abdominal pain  Pt states having some diarrhea yesterday  Assessment/Plan: 62 yo male to ED from home w/ abd pain radiates across lower abd  Most prominent in LLQ   + diarrhea most of the day   Vomited x3 at home  Ct scan revealed concern for SBO and NG tube was placed in ED   Admitted IP status w/ plan for conservation management , NG LIWS ,NPO , IVF , serial labs and abd exams , analgesics and antiemetics prn      ED Triage Vitals [10/14/19 0040]   Temperature Pulse Respirations Blood Pressure SpO2   97 6 °F (36 4 °C) (!) 117 22 (!) 176/118 97 %      Temp Source Heart Rate Source Patient Position - Orthostatic VS BP Location FiO2 (%)   Oral Monitor Lying Right arm --      Pain Score       Worst Possible Pain        Wt Readings from Last 1 Encounters:   10/14/19 71 kg (156 lb 8 4 oz)     Additional Vital Signs:   10/15/19 08:04:44  98 5 °F (36 9 °C)  82    131/85  100  96 %       10/14/19 23:55:26  98 3 °F (36 8 °C)  95  18  129/85  100  97 %       10/14/19 18:17:31  98 9 °F (37 2 °C)  91  20  137/77  97  97 %  None (Room air)  Lying   10/14/19 1602    92  18  142/82    97 %  None (Room air)  Lying   10/14/19 1230    80  15  141/76    97 %    Lying   10/14/19 1115    95  16  152/97  116         10/14/19 0915  98 °F (36 7 °C)  100  12  156/105Abnormal   124         10/14/19 0330    97  20  154/91    96 %  None (Room air)  Lying   10/14/19 0300    106Abnormal   20  185/112Abnormal     100 %  None (Room          Pertinent Labs/Diagnostic Test Results:   10/14 CT abd-    High-grade small bowel obstruction  The transition point is identified in the left lower quadrant and there is twisting of the mesentery, raising concern for closed loop small bowel obstruction    Small amount of ascites    10/14 OBS series    Redemonstration of a high-grade small bowel obstruction  10/15 OBS series   Slight interval improvement in previously identified small bowel obstruction    Results from last 7 days   Lab Units 10/15/19  0645 10/14/19  0442 10/14/19  0054   WBC Thousand/uL 5 42 7 07 8 39   HEMOGLOBIN g/dL 14 6 16 3 18 2*   HEMATOCRIT % 44 4 48 9 53 8*   PLATELETS Thousands/uL 181 197 218   NEUTROS ABS Thousands/µL 3 28 5 69 6 68     Results from last 7 days   Lab Units 10/15/19  0645 10/14/19  0442 10/14/19  0054   SODIUM mmol/L 140 137 135*   POTASSIUM mmol/L 4 0 4 0 3 9   CHLORIDE mmol/L 109* 104 98*   CO2 mmol/L 20* 24 24   ANION GAP mmol/L 11 9 13   BUN mg/dL 23 19 21   CREATININE mg/dL 0 97 0 94 1 24   EGFR ml/min/1 73sq m 85 88 63   CALCIUM mg/dL 8 1* 8 2* 9 4   MAGNESIUM mg/dL  --  2 0  --    PHOSPHORUS mg/dL  --  2 9  --      Results from last 7 days   Lab Units 10/14/19  0442 10/14/19  0054   AST U/L 20 23   ALT U/L 30 38   ALK PHOS U/L 109 126*   TOTAL PROTEIN g/dL 7 0 8 4*   ALBUMIN g/dL 3 4* 4 0   TOTAL BILIRUBIN mg/dL 0 60 1 00     Results from last 7 days   Lab Units 10/15/19  0649   POC GLUCOSE mg/dl 72     Results from last 7 days   Lab Units 10/15/19  0645 10/14/19  0442 10/14/19  0054   GLUCOSE RANDOM mg/dL 77 124 154*     Results from last 7 days   Lab Units 10/14/19  1853   PROCALCITONIN ng/ml 0 25     Results from last 7 days   Lab Units 10/14/19  1201 10/14/19  0054   LACTIC ACID mmol/L 1 0 2 5*     Results from last 7 days   Lab Units 10/14/19  0054   LIPASE u/L 82     Results from last 7 days   Lab Units 10/14/19  0255 10/14/19  0253   BLOOD CULTURE  No Growth at 24 hrs  No Growth at 24 hrs         ED Treatment:   Medication Administration from 10/14/2019 0032 to 10/14/2019 1744       Date/Time Order Dose Route Action     10/14/2019 0054 sodium chloride 0 9 % bolus 1,000 mL 1,000 mL Intravenous New Bag     10/14/2019 0050 ondansetron (ZOFRAN) injection 4 mg 4 mg Intravenous Given     10/14/2019 0259 sodium chloride 0 9 % bolus 1,000 mL 1,000 mL Intravenous New Bag     10/14/2019 0303 morphine injection 2 mg 2 mg Intravenous Given     10/14/2019 0436 sodium chloride 0 9 % infusion 125 mL/hr Intravenous New Bag     10/14/2019 0528 ondansetron (ZOFRAN) injection 4 mg 4 mg Intravenous Given     10/14/2019 1431 heparin (porcine) subcutaneous injection 5,000 Units 5,000 Units Subcutaneous Given     10/14/2019 0539 heparin (porcine) subcutaneous injection 5,000 Units 5,000 Units Subcutaneous Given     10/14/2019 1210 ceFAZolin (ANCEF) IVPB (premix) 2,000 mg 2,000 mg Intravenous New Bag     10/14/2019 0450 ceFAZolin (ANCEF) IVPB (premix) 2,000 mg 2,000 mg Intravenous New Bag     10/14/2019 0931 famotidine (PEPCID) injection 20 mg 20 mg Intravenous Given     10/14/2019 0635 influenza vaccine, recombinant, quadrivalent (FLUBLOK) IM injection 0 5 mL 0 5 mL Intramuscular Given     10/14/2019 1604 metoprolol (LOPRESSOR) injection 5 mg 5 mg Intravenous Given Past Medical History:   Diagnosis Date    Hyperlipidemia     Hypertension      Present on Admission:   Small bowel obstruction    Essential hypertension      Admitting Diagnosis: Vomiting [R11 10]  Small bowel obstruction ([address] ) [K56 609]  Essential hypertension [I10]  Age/Sex: 61 y o  male  Admission Orders:    Medications:  cefazolin 2,000 mg Intravenous Q8H   famotidine 20 mg Intravenous BID   heparin (porcine) 5,000 Units Subcutaneous Q8H [address]       sodium chloride 125 mL/hr Intravenous Continuous       metoprolol 5 mg Intravenous Q6H PRN  x1   morphine injection 2 mg Intravenous Q1H PRN   ondansetron 4 mg Intravenous Q6H PRN x1   phenol 1 spray Mouth/Throat Q2H PRN     I&O   NPO   SCD  NG LIWS   I&O     IP CONSULT TO INTERNAL MEDICINE    Network Utilization Review Department  Phone: [phone]; Fax [phone]  [email or web address]  ATTENTION: Please call with any questions or concerns to [phone]  and carefully listen to the prompts so that you are directed to the right person  Send all requests for admission clinical reviews, approved or denied determinations and any other requests to fax [phone]   All voicemails are confidential

## 2019-10-15 NOTE — ASSESSMENT & PLAN NOTE
Will resume oral antihypertensives once patient progresses to a regular diet  Blood pressures appear controlled on review  PRN IV Lopressor on board  optimize pain control - low-sodium restriction once back on oral diet

## 2019-10-15 NOTE — ASSESSMENT & PLAN NOTE
presents with tachycardia/tachypnea on admission with associated lactic acidosis - serial lactic acid level normalized with IV fluid hydration   blood cultures drawn in the ER - in the absence of fever/leukocytosis or suspicion for obvious clinical infection, observe off antibiotics pending  Blood cultures negative for 24 hours  Procalcitonin is negative  monitor vitals and maintain hemodynamics

## 2019-10-15 NOTE — ASSESSMENT & PLAN NOTE
management per primary service (general surgery)  NG tube removed this morning, clear liquid diet  initially noted on CT imaging confirmed on subsequent XR obstruction series

## 2019-10-15 NOTE — PROGRESS NOTES
Progress Note -Surgery PA  Emmanuelle Larsen 61 y o  male MRN: [id number]  Unit/Bed#: -01 Encounter: [id number]      Assessment   Small Bowel Obstruction vs Enteritis  - NGT with no output since this morning  100cc overnight   - No further abdominal pain, +flatus  - No leukocytosis, afebrile, VSS  - lactic acidosis resolved after rehydration  Plan   -- D/c NG tube and start CLD  If tolerating may advance later today and restart oral medications  -- Continue with serial abdominal exams  -- encourage ambulation   ______________________________________________________________________  Subjective:   Patient feeling better today  Feeling hungry  Denies f/c  Denies abdominal pain, n/v  Passing flatus, no BM  Objective:    Vitals:  /85   Pulse 82   Temp 98 5 °F (36 9 °C)   Resp 18   Ht 5' 9" (1 753 m)   Wt 71 kg (156 lb 8 4 oz)   SpO2 96%   BMI 23 11 kg/m²     I/Os:  I/O last 3 completed shifts: In: 2250 [I V :125; IV Piggyback:2125]  Out: 1700 [Urine:400; Emesis/NG output:1300]    No intake/output data recorded      Invasive Devices     Peripheral Intravenous Line            Peripheral IV 10/14/19 Right Antecubital 1 day          Drain            NG/OG/Enteral Tube -- days    NG/OG/Enteral Tube Nasogastric 14 Fr Right nares 1 day                Medications:  Current Facility-Administered Medications   Medication Dose Route Frequency    ceFAZolin (ANCEF) IVPB (premix) 2,000 mg  2,000 mg Intravenous Q8H    famotidine (PEPCID) injection 20 mg  20 mg Intravenous BID    heparin (porcine) subcutaneous injection 5,000 Units  5,000 Units Subcutaneous Q8H [facility]    metoprolol (LOPRESSOR) injection 5 mg  5 mg Intravenous Q6H PRN    morphine injection 2 mg  2 mg Intravenous Q1H PRN    ondansetron (ZOFRAN) injection 4 mg  4 mg Intravenous Q6H PRN    phenol (CHLORASEPTIC) 1 4 % mucosal liquid 1 spray  1 spray Mouth/Throat Q2H PRN    sodium chloride 0 9 % infusion  125 mL/hr Intravenous Continuous     Lab Results and Cultures:   CBC with diff:   Lab Results   Component Value Date    WBC 5 42 10/15/2019    HGB 14 6 10/15/2019    HCT 44 4 10/15/2019    MCV 89 10/15/2019     10/15/2019    MCH 29 2 10/15/2019    MCHC 32 9 10/15/2019    RDW 13 5 10/15/2019    MPV 10 0 10/15/2019    NRBC 0 10/15/2019       BMP/CMP:  Lab Results   Component Value Date    K 4 0 10/15/2019     (H) 10/15/2019    CO2 20 (L) 10/15/2019    BUN 23 10/15/2019    CREATININE 0 97 10/15/2019    CALCIUM 8 1 (L) 10/15/2019    AST 20 10/14/2019    ALT 30 10/14/2019    ALKPHOS 109 10/14/2019    EGFR 85 10/15/2019       Physical Exam:  General Appearance:    Alert and orientated x 3, cooperative, no distress, appears stated age   Lungs:     Clear to auscultation bilaterally, respirations unlabored, no wheezes    Heart:    Regular rate and rhythm, S1 and S2 normal, no murmur   Abdomen:    Normoactive BS, soft, non tender, non rigid, no masses, no palpated organomegaly   Extremities:  Extremities normal, no calf tenderness, no cyanosis or edema   Pulses:   2+ and symmetric all extremities   Skin:   Skin color, texture, turgor normal, no rashes   Neurologic:   CNII-XII intact, normal strength, affect appropriate       Imaging:  Xr Abdomen Obstruction Series  Result Date: 10/15/2019  Impression: Slight interval improvement in previously identified small bowel obstruction  Workstation performed: [id number]     Xr Abdomen Obstruction Series  Result Date: 10/14/2019  Impression: Redemonstration of a high-grade small bowel obstruction  Workstation performed: [id number]     Ct Abdomen Pelvis W Contrast  Result Date: 10/14/2019  Impression: High-grade small bowel obstruction  The transition point is identified in the left lower quadrant and there is twisting of the mesentery, raising concern for closed loop small bowel obstruction  Small amount of ascites   Findings are consistent with the preliminary report from [facility] which was provided shortly after completion of the exam  Workstation performed: [id number]       VTE Pharmacologic Prophylaxis: Heparin  VTE Mechanical Prophylaxis: sequential compression device    Dariel Vogel PA-C   10/15/2019

## 2019-10-15 NOTE — PLAN OF CARE
Problem: Potential for Falls  Goal: Patient will remain free of falls  Description  INTERVENTIONS:  - Assess patient frequently for physical needs  -  Identify cognitive and physical deficits and behaviors that affect risk of falls    -  [place] fall precautions as indicated by assessment   - Educate patient/family on patient safety including physical limitations  - Instruct patient to call for assistance with activity based on assessment  - Modify environment to reduce risk of injury  - Consider OT/PT consult to assist with strengthening/mobility  Outcome: Progressing     Problem: PAIN - ADULT  Goal: Verbalizes/displays adequate comfort level or baseline comfort level  Description  Interventions:  - Encourage patient to monitor pain and request assistance  - Assess pain using appropriate pain scale  - Administer analgesics based on type and severity of pain and evaluate response  - Implement non-pharmacological measures as appropriate and evaluate response  - Consider cultural and social influences on pain and pain management  - Notify physician/advanced practitioner if interventions unsuccessful or patient reports new pain  Outcome: Progressing     Problem: INFECTION - ADULT  Goal: Absence or prevention of progression during hospitalization  Description  INTERVENTIONS:  - Assess and monitor for signs and symptoms of infection  - Monitor lab/diagnostic results  - Monitor all insertion sites, i e  indwelling lines, tubes, and drains  - Monitor endotracheal if appropriate and nasal secretions for changes in amount and color  - [place] appropriate cooling/warming therapies per order  - Administer medications as ordered  - Instruct and encourage patient and family to use good hand hygiene technique  - Identify and instruct in appropriate isolation precautions for identified infection/condition  Outcome: Progressing  Goal: Absence of fever/infection during neutropenic period  Description  INTERVENTIONS:  - Monitor WBC    Outcome: Progressing     Problem: SAFETY ADULT  Goal: Maintain or return to baseline ADL function  Description  INTERVENTIONS:  -  Assess patient's ability to carry out ADLs; assess patient's baseline for ADL function and identify physical deficits which impact ability to perform ADLs (bathing, care of mouth/teeth, toileting, grooming, dressing, etc )  - Assess/evaluate cause of self-care deficits   - Assess range of motion  - Assess patient's mobility; develop plan if impaired  - Assess patient's need for assistive devices and provide as appropriate  - Encourage maximum independence but intervene and supervise when necessary  - Involve family in performance of ADLs  - Assess for home care needs following discharge   - Consider OT consult to assist with ADL evaluation and planning for discharge  - Provide patient education as appropriate  Outcome: Progressing  Goal: Maintain or return mobility status to optimal level  Description  INTERVENTIONS:  - Assess patient's baseline mobility status (ambulation, transfers, stairs, etc )    - Identify cognitive and physical deficits and behaviors that affect mobility  - Identify mobility aids required to assist with transfers and/or ambulation (gait belt, sit-to-stand, lift, walker, cane, etc )  - [place] fall precautions as indicated by assessment  - Record patient progress and toleration of activity level on Mobility SBAR; progress patient to next Phase/Stage  - Instruct patient to call for assistance with activity based on assessment  - Consider rehabilitation consult to assist with strengthening/weightbearing, etc   Outcome: Progressing     Problem: DISCHARGE PLANNING  Goal: Discharge to home or other facility with appropriate resources  Description  INTERVENTIONS:  - Identify barriers to discharge w/patient and caregiver  - Arrange for needed discharge resources and transportation as appropriate  - Identify discharge learning needs (meds, wound care, etc )  - Arrange for interpretive services to assist at discharge as needed  - Refer to Case Management Department for coordinating discharge planning if the patient needs post-hospital services based on physician/advanced practitioner order or complex needs related to functional status, cognitive ability, or social support system  Outcome: Progressing     Problem: Knowledge Deficit  Goal: Patient/family/caregiver demonstrates understanding of disease process, treatment plan, medications, and discharge instructions  Description  Complete learning assessment and assess knowledge base    Interventions:  - Provide teaching at level of understanding  - Provide teaching via preferred learning methods  Outcome: Progressing

## 2019-10-15 NOTE — PLAN OF CARE
Problem: Potential for Falls  Goal: Patient will remain free of falls  Description  INTERVENTIONS:  - Assess patient frequently for physical needs  -  Identify cognitive and physical deficits and behaviors that affect risk of falls    -  [place] fall precautions as indicated by assessment   - Educate patient/family on patient safety including physical limitations  - Instruct patient to call for assistance with activity based on assessment  - Modify environment to reduce risk of injury  - Consider OT/PT consult to assist with strengthening/mobility  Outcome: Progressing

## 2019-10-16 ENCOUNTER — APPOINTMENT (INPATIENT)
Dept: RADIOLOGY | Facility: HOSPITAL | Age: 59
DRG: 389 | End: 2019-10-16

## 2019-10-16 LAB
ANION GAP SERPL CALCULATED.3IONS-SCNC: 11 MMOL/L (ref 4–13)
BACTERIA UR QL AUTO: ABNORMAL /HPF
BILIRUB UR QL STRIP: NEGATIVE
BUN SERPL-MCNC: 18 MG/DL (ref 5–25)
CALCIUM SERPL-MCNC: 8.5 MG/DL (ref 8.3–10.1)
CHLORIDE SERPL-SCNC: 106 MMOL/L (ref 100–108)
CLARITY UR: CLEAR
CO2 SERPL-SCNC: 22 MMOL/L (ref 21–32)
COLOR UR: YELLOW
CREAT SERPL-MCNC: 0.97 MG/DL (ref 0.6–1.3)
ERYTHROCYTE [DISTWIDTH] IN BLOOD BY AUTOMATED COUNT: 13.3 % (ref 11.6–15.1)
GFR SERPL CREATININE-BSD FRML MDRD: 85 ML/MIN/1.73SQ M
GLUCOSE SERPL-MCNC: 86 MG/DL (ref 65–140)
GLUCOSE UR STRIP-MCNC: NEGATIVE MG/DL
HCT VFR BLD AUTO: 41.5 % (ref 36.5–49.3)
HGB BLD-MCNC: 14.1 G/DL (ref 12–17)
HGB UR QL STRIP.AUTO: NEGATIVE
KETONES UR STRIP-MCNC: ABNORMAL MG/DL
LEUKOCYTE ESTERASE UR QL STRIP: NEGATIVE
MCH RBC QN AUTO: 29.9 PG (ref 26.8–34.3)
MCHC RBC AUTO-ENTMCNC: 34 G/DL (ref 31.4–37.4)
MCV RBC AUTO: 88 FL (ref 82–98)
NITRITE UR QL STRIP: NEGATIVE
NON-SQ EPI CELLS URNS QL MICRO: ABNORMAL /HPF
PH UR STRIP.AUTO: 5.5 [PH]
PLATELET # BLD AUTO: 172 THOUSANDS/UL (ref 149–390)
PMV BLD AUTO: 10.3 FL (ref 8.9–12.7)
POTASSIUM SERPL-SCNC: 3.5 MMOL/L (ref 3.5–5.3)
PROT UR STRIP-MCNC: ABNORMAL MG/DL
RBC # BLD AUTO: 4.71 MILLION/UL (ref 3.88–5.62)
RBC #/AREA URNS AUTO: ABNORMAL /HPF
SODIUM SERPL-SCNC: 139 MMOL/L (ref 136–145)
SP GR UR STRIP.AUTO: 1.02 (ref 1–1.03)
UROBILINOGEN UR QL STRIP.AUTO: 0.2 E.U./DL
WBC # BLD AUTO: 3.94 THOUSAND/UL (ref 4.31–10.16)
WBC #/AREA URNS AUTO: ABNORMAL /HPF

## 2019-10-16 PROCEDURE — 87081 CULTURE SCREEN ONLY: CPT | Performed by: PHYSICIAN ASSISTANT

## 2019-10-16 PROCEDURE — 99232 SBSQ HOSP IP/OBS MODERATE 35: CPT | Performed by: SURGERY

## 2019-10-16 PROCEDURE — NC001 PR NO CHARGE: Performed by: SURGERY

## 2019-10-16 PROCEDURE — 99231 SBSQ HOSP IP/OBS SF/LOW 25: CPT | Performed by: PHYSICIAN ASSISTANT

## 2019-10-16 PROCEDURE — 80048 BASIC METABOLIC PNL TOTAL CA: CPT | Performed by: NURSE PRACTITIONER

## 2019-10-16 PROCEDURE — 81001 URINALYSIS AUTO W/SCOPE: CPT | Performed by: PHYSICIAN ASSISTANT

## 2019-10-16 PROCEDURE — 74022 RADEX COMPL AQT ABD SERIES: CPT

## 2019-10-16 PROCEDURE — 85027 COMPLETE CBC AUTOMATED: CPT | Performed by: NURSE PRACTITIONER

## 2019-10-16 RX ORDER — LIDOCAINE HYDROCHLORIDE 20 MG/ML
JELLY TOPICAL ONCE
Status: COMPLETED | OUTPATIENT
Start: 2019-10-16 | End: 2019-10-16

## 2019-10-16 RX ORDER — LISINOPRIL 20 MG/1
20 TABLET ORAL DAILY
Status: DISCONTINUED | OUTPATIENT
Start: 2019-10-16 | End: 2019-10-17

## 2019-10-16 RX ORDER — DEXTROSE, SODIUM CHLORIDE, AND POTASSIUM CHLORIDE 5; .45; .15 G/100ML; G/100ML; G/100ML
75 INJECTION INTRAVENOUS CONTINUOUS
Status: DISCONTINUED | OUTPATIENT
Start: 2019-10-16 | End: 2019-10-19

## 2019-10-16 RX ORDER — PRAVASTATIN SODIUM 40 MG
40 TABLET ORAL
Status: DISCONTINUED | OUTPATIENT
Start: 2019-10-16 | End: 2019-10-20 | Stop reason: HOSPADM

## 2019-10-16 RX ADMIN — CEFAZOLIN SODIUM 2000 MG: 2 SOLUTION INTRAVENOUS at 05:06

## 2019-10-16 RX ADMIN — LISINOPRIL 20 MG: 20 TABLET ORAL at 17:24

## 2019-10-16 RX ADMIN — DEXTROSE, SODIUM CHLORIDE, AND POTASSIUM CHLORIDE 75 ML/HR: 5; .45; .15 INJECTION INTRAVENOUS at 19:42

## 2019-10-16 RX ADMIN — HEPARIN SODIUM 5000 UNITS: 5000 INJECTION INTRAVENOUS; SUBCUTANEOUS at 23:15

## 2019-10-16 RX ADMIN — FAMOTIDINE 20 MG: 10 INJECTION, SOLUTION INTRAVENOUS at 10:04

## 2019-10-16 RX ADMIN — HEPARIN SODIUM 5000 UNITS: 5000 INJECTION INTRAVENOUS; SUBCUTANEOUS at 17:25

## 2019-10-16 RX ADMIN — FAMOTIDINE 20 MG: 10 INJECTION, SOLUTION INTRAVENOUS at 17:25

## 2019-10-16 RX ADMIN — LIDOCAINE HYDROCHLORIDE: 20 JELLY TOPICAL at 18:40

## 2019-10-16 RX ADMIN — PRAVASTATIN SODIUM 40 MG: 40 TABLET ORAL at 17:25

## 2019-10-16 RX ADMIN — HEPARIN SODIUM 5000 UNITS: 5000 INJECTION INTRAVENOUS; SUBCUTANEOUS at 05:06

## 2019-10-16 NOTE — ASSESSMENT & PLAN NOTE
· Pt presented with abdominal pain, nausea and vomiting  · CT scan on admission with high grade bowel obstruction   · General surgery is primary service  · NGT removed on 10/15  · Plan to repeat XR obstruction series today, results pending  · Continue surgical soft diet, tolerating well, advance per surgery   · Serial abdominal exams and ambulation

## 2019-10-16 NOTE — PROGRESS NOTES
Surgical Rounds - General Surgery   Marcello Hanson 61 y o  male MRN: [id number]  Unit/Bed#: -01 Encounter: [id number]      Daily Surgical Rounds    Patient seen and assessed by Dr Neil Keller, and surgical team at 3:00 PM       Kathy Lozoya PA-C  10/16/2019

## 2019-10-16 NOTE — PROGRESS NOTES
Progress Note - General Surgery   Millie Mccarthy 61 y o  male MRN: [id number]  Unit/Bed#: -01 Encounter: [id number]      Assessment:   51-year-old male with SBO versus enteritis  - NGT was removed yesterday  - patient reports a small loose bowel movements yesterday and today  No flatus since yesterday  - patient is afebrile and WBC is within normal limits  - patient reports some post prandial abdominal distention, but no nausea or vomiting    Plan:  - Will repeat obstruction series to compare with previous as the patient is reporting some postprandial bloating  - continue with diet as tolerated  - serial abdominal exams  - encourage ambulation  - DVT prophylaxis  - medical management per primary team    Subjective/Objective   Chief Complaint:  Feel a little bloated after breakfast    Subjective:  No acute events overnight  Patient reports 2 small loose bowel movements in the last 2 days, but denies flatus today  Patient has been advanced to surgical soft diet with his she is tolerating without nausea or vomiting, but does report some abdominal distention after eating  Patient is ambulating urinating without difficulty  Objective:     Blood pressure 127/83, pulse 78, temperature 98 1 °F (36 7 °C), resp  rate 20, height 5' 9" (1 753 m), weight 71 kg (156 lb 8 4 oz), SpO2 97 %  Body mass index is 23 11 kg/m²  I/O       10/14 0701 - 10/15 0700 10/15 0701 - 10/16 0700 10/16 0701 - 10/17 0700    P  O  0 720 240    I V  (mL/kg) 125 (1 8) 1479 2 (20 8)     IV Piggyback 1125      Total Intake(mL/kg) 1250 (17 6) 2199 2 (31) 240 (3 4)    Urine (mL/kg/hr) 400 (0 2) 400 (0 2)     Emesis/NG output 900      Stool 0      Total Output 1300 400     Net -50 +1799 2 +240           Unmeasured Urine Occurrence 0 x 5 x     Unmeasured Stool Occurrence 0 x            Invasive Devices     Peripheral Intravenous Line            Peripheral IV 10/14/19 Right Antecubital 2 days          Drain            NG/OG/Enteral Tube -- days                Physical Exam: /83   Pulse 78   Temp 98 1 °F (36 7 °C)   Resp 20   Ht 5' 9" (1 753 m)   Wt 71 kg (156 lb 8 4 oz)   SpO2 97%   BMI 23 11 kg/m²   General appearance: alert and oriented, in no acute distress  Lungs: clear to auscultation bilaterally  Heart: regular rate and rhythm and S1, S2 normal  Abdomen: Soft, slightly distended, hypoactive bowel sounds present throughout, nontender to palpation,  Extremities: extremities normal, warm and well-perfused; no cyanosis, clubbing, or edema    Labs   Recent Results (from the past 24 hour(s))   UA w Reflex to Microscopic w Reflex to Culture    Collection Time: 10/16/19 12:36 AM   Result Value Ref Range    Color, UA Yellow     Clarity, UA Clear     Specific Gravity, UA 1 025 1 003 - 1 030    pH, UA 5 5 4 5, 5 0, 5 5, 6 0, 6 5, 7 0, 7 5, 8 0    Leukocytes, UA Negative Negative    Nitrite, UA Negative Negative    Protein, UA Trace (A) Negative mg/dl    Glucose, UA Negative Negative mg/dl    Ketones, UA 15 (1+) (A) Negative mg/dl    Urobilinogen, UA 0 2 0 2, 1 0 E U /dl E U /dl    Bilirubin, UA Negative Negative    Blood, UA Negative Negative   Urine Microscopic    Collection Time: 10/16/19 12:36 AM   Result Value Ref Range    RBC, UA 0-1 (A) None Seen, 0-5 /hpf    WBC, UA None Seen None Seen, 0-5, 5-55, 5-65 /hpf    Epithelial Cells Occasional None Seen, Occasional /hpf    Bacteria, UA Occasional None Seen, Occasional /hpf   Basic metabolic panel    Collection Time: 10/16/19  5:45 AM   Result Value Ref Range    Sodium 139 136 - 145 mmol/L    Potassium 3 5 3 5 - 5 3 mmol/L    Chloride 106 100 - 108 mmol/L    CO2 22 21 - 32 mmol/L    ANION GAP 11 4 - 13 mmol/L    BUN 18 5 - 25 mg/dL    Creatinine 0 97 0 60 - 1 30 mg/dL    Glucose 86 65 - 140 mg/dL    Calcium 8 5 8 3 - 10 1 mg/dL    eGFR 85 ml/min/1 73sq m   CBC    Collection Time: 10/16/19  5:45 AM   Result Value Ref Range    WBC 3 94 (L) 4 31 - 10 16 Thousand/uL    RBC 4 71 3 88 - 5 62 Million/uL    Hemoglobin 14 1 12 0 - 17 0 g/dL    Hematocrit 41 5 36 5 - 49 3 %    MCV 88 82 - 98 fL    MCH 29 9 26 8 - 34 3 pg    MCHC 34 0 31 4 - 37 4 g/dL    RDW 13 3 11 6 - 15 1 %    Platelets 167 511 - 081 Thousands/uL    MPV 10 3 8 9 - 12 7 fL        Imaging and other studies:  Xr Abdomen Obstruction Series    Result Date: 10/15/2019  Impression: Slight interval improvement in previously identified small bowel obstruction  Workstation performed: [id number]     Xr Abdomen Obstruction Series    Result Date: 10/14/2019  Impression: Redemonstration of a high-grade small bowel obstruction  Workstation performed: [id number]     Ct Abdomen Pelvis W Contrast    Result Date: 10/14/2019  Impression: High-grade small bowel obstruction  The transition point is identified in the left lower quadrant and there is twisting of the mesentery, raising concern for closed loop small bowel obstruction  Small amount of ascites   Findings are consistent with the preliminary report from [facility] which was provided shortly after completion of the exam  Workstation performed: [id number]       VTE Pharmacologic Prophylaxis: Heparin  VTE Mechanical Prophylaxis: sequential compression device    Hiro Coelho PA-C  10/16/2019

## 2019-10-16 NOTE — ASSESSMENT & PLAN NOTE
· Pt presented with SIRS criteria on admisison with tachycardia, tachypnea and lactic acidosis which resolved with IVF hydration   · Likely in setting of acute SBO  · Blood cultures negative x 48 hours, no clinical suspicion for any acute bacterial infectious process  · Procalcitonin negative  · Continue supportive treatment for acute SBO as above  · Monitor

## 2019-10-16 NOTE — PROGRESS NOTES
Progress Note - Alli Rossi 1960, 61 y o  male MRN: [id number]    Unit/Bed#: -01 Encounter: [id number]    Primary Care Provider: Hector Sesay DO   Date and time admitted to hospital: 10/14/2019 12:36 AM      DOS: 10/16/2019    * Small bowel obstruction   Assessment & Plan  · Pt presented with abdominal pain, nausea and vomiting  · CT scan on admission with high grade bowel obstruction   · General surgery is primary service  · NGT removed on 10/15  · Plan to repeat XR obstruction series today, results pending  · Continue surgical soft diet, tolerating well, advance per surgery   · Serial abdominal exams and ambulation     SIRS (systemic inflammatory response syndrome)  Assessment & Plan  · Pt presented with SIRS criteria on admisison with tachycardia, tachypnea and lactic acidosis which resolved with IVF hydration   · Likely in setting of acute SBO  · Blood cultures negative x 48 hours, no clinical suspicion for any acute bacterial infectious process  · Procalcitonin negative  · Continue supportive treatment for acute SBO as above  · Monitor    Hyperlipidemia  Assessment & Plan  · Resume pravastatin 40 mg daily as patient able to tolerate PO    Essential hypertension  Assessment & Plan  · BP has remained stable here on review  · Resume patient's lisinopril 20 mg daily   · Continue PRN lopressor for SBP >160   · Continue pain control, pain appears adequately controlled at this time  · Monitor      VTE Pharmacologic Prophylaxis:   Pharmacologic: Heparin  Mechanical VTE Prophylaxis in Place: Yes    Patient Centered Rounds: I have evaluated patient without nursing staff present due to speaking to nurse outside patient's room    Discussions with Specialists or Other Care Team Provider: Discussed with surgery, RN, CM and reviewed previous notes     Education and Discussions with Family / Patient: Discussed with patient at bedside regarding plan of care, no friends or family members present at time of evaluation     Time Spent for Care: 15 minutes  More than 50% of total time spent on counseling and coordination of care as described above  Current Length of Stay: 2 day(s)    Current Patient Status: Inpatient   Certification Statement: The patient will continue to require additional inpatient hospital stay due to monitoring for toleration of diet, obstruction series    Discharge Plan: Per general surgery recommendations     Code Status: Level 1 - Full Code      Subjective:   Pt reports that he feels well today  States that he ate breakfast and did have some increased bloating sensation  Otherwise denied any nausea, vomiting or abdominal pain  Currently denies any chest pain or shortness of breath  Reports that he had a loose bowel movement today and is passing flatus  Objective:     Vitals:   Temp (24hrs), Av 2 °F (36 8 °C), Min:97 9 °F (36 6 °C), Max:98 6 °F (37 °C)    Temp:  [97 9 °F (36 6 °C)-98 6 °F (37 °C)] 98 1 °F (36 7 °C)  HR:  [61-79] 78  Resp:  [20] 20  BP: (127-134)/(80-83) 127/83  SpO2:  [97 %-99 %] 97 %  Body mass index is 23 11 kg/m²  Input and Output Summary (last 24 hours): Intake/Output Summary (Last 24 hours) at 10/16/2019 1352  Last data filed at 10/16/2019 1326  Gross per 24 hour   Intake 1679 17 ml   Output    Net 1679 17 ml       Physical Exam:     Physical Exam   Constitutional: No distress  Pt is in no acute distress lying in his hospital bed resting comfortably  Pleasant and cooperative    HENT:   Head: Normocephalic and atraumatic  Eyes: Pupils are equal, round, and reactive to light  Conjunctivae are normal    Cardiovascular: Normal rate, regular rhythm and intact distal pulses  Pulmonary/Chest: Effort normal and breath sounds normal  No stridor  No respiratory distress  He has no wheezes  Abdominal: Soft  Bowel sounds are normal  He exhibits distension (mild)  There is no tenderness  There is no guarding  Musculoskeletal: He exhibits no edema  Neurological: He is alert  Skin: Skin is warm and dry  He is not diaphoretic  No erythema  Psychiatric: He has a normal mood and affect  Vitals reviewed  Additional Data:     Labs:    Results from last 7 days   Lab Units 10/16/19  0545 10/15/19  0645   WBC Thousand/uL 3 94* 5 42   HEMOGLOBIN g/dL 14 1 14 6   HEMATOCRIT % 41 5 44 4   PLATELETS Thousands/uL 172 181   NEUTROS PCT %  --  60   LYMPHS PCT %  --  13*   MONOS PCT %  --  22*   EOS PCT %  --  4     Results from last 7 days   Lab Units 10/16/19  0545  10/14/19  0442   POTASSIUM mmol/L 3 5   < > 4 0   CHLORIDE mmol/L 106   < > 104   CO2 mmol/L 22   < > 24   BUN mg/dL 18   < > 19   CREATININE mg/dL 0 97   < > 0 94   CALCIUM mg/dL 8 5   < > 8 2*   ALK PHOS U/L  --   --  109   ALT U/L  --   --  30   AST U/L  --   --  20    < > = values in this interval not displayed  * I Have Reviewed All Lab Data Listed Above  * Additional Pertinent Lab Tests Reviewed: All Labs Within Last 24 Hours Reviewed    Imaging:    Imaging Reports Reviewed Today Include: CT, xray obstruction series x 2  Imaging Personally Reviewed by Myself Includes:  None    Recent Cultures (last 7 days):     Results from last 7 days   Lab Units 10/14/19  0255 10/14/19  0253   BLOOD CULTURE  No Growth at 48 hrs  No Growth at 48 hrs         Last 24 Hours Medication List:     Current Facility-Administered Medications:  cefazolin 2,000 mg Intravenous Q8H Isa Siemens, MD Last Rate: 2,000 mg (10/16/19 0506)   famotidine 20 mg Intravenous BID Isa Siemens, MD    heparin (porcine) 5,000 Units Subcutaneous [facility] Isa Siemens, MD    metoprolol 5 mg Intravenous Q6H PRN Patrick Broussard MD    morphine injection 2 mg Intravenous Q1H PRN Isa Siemens, MD    ondansetron 4 mg Intravenous Q6H PRN Isa Siemens, MD    phenol 1 spray Mouth/Throat Q2H PRN Isa Siemens, MD         Today, Patient Was Seen By: Victor Manuel Shay PA-C    ** Please Note: Dictation voice to text software may have been used in the creation of this document   **

## 2019-10-16 NOTE — ASSESSMENT & PLAN NOTE
· BP has remained stable here on review  · Resume patient's lisinopril 20 mg daily   · Continue PRN lopressor for SBP >160   · Continue pain control, pain appears adequately controlled at this time  · Monitor

## 2019-10-17 ENCOUNTER — APPOINTMENT (INPATIENT)
Dept: RADIOLOGY | Facility: HOSPITAL | Age: 59
DRG: 389 | End: 2019-10-17

## 2019-10-17 LAB
ABO GROUP BLD: NORMAL
ALBUMIN SERPL BCP-MCNC: 2.7 G/DL (ref 3.5–5)
ALP SERPL-CCNC: 83 U/L (ref 46–116)
ALT SERPL W P-5'-P-CCNC: 9 U/L (ref 12–78)
ANION GAP SERPL CALCULATED.3IONS-SCNC: 11 MMOL/L (ref 4–13)
AST SERPL W P-5'-P-CCNC: 15 U/L (ref 5–45)
BASOPHILS # BLD MANUAL: 0 THOUSAND/UL (ref 0–0.1)
BASOPHILS NFR MAR MANUAL: 0 % (ref 0–1)
BILIRUB SERPL-MCNC: 0.5 MG/DL (ref 0.2–1)
BLD GP AB SCN SERPL QL: NEGATIVE
BUN SERPL-MCNC: 12 MG/DL (ref 5–25)
CA-I BLD-SCNC: 1.12 MMOL/L (ref 1.12–1.32)
CALCIUM SERPL-MCNC: 8.2 MG/DL (ref 8.3–10.1)
CHLORIDE SERPL-SCNC: 108 MMOL/L (ref 100–108)
CO2 SERPL-SCNC: 22 MMOL/L (ref 21–32)
CREAT SERPL-MCNC: 0.85 MG/DL (ref 0.6–1.3)
EOSINOPHIL # BLD MANUAL: 0.17 THOUSAND/UL (ref 0–0.4)
EOSINOPHIL NFR BLD MANUAL: 5 % (ref 0–6)
ERYTHROCYTE [DISTWIDTH] IN BLOOD BY AUTOMATED COUNT: 13.3 % (ref 11.6–15.1)
EST. AVERAGE GLUCOSE BLD GHB EST-MCNC: 91 MG/DL
GFR SERPL CREATININE-BSD FRML MDRD: 95 ML/MIN/1.73SQ M
GLUCOSE SERPL-MCNC: 86 MG/DL (ref 65–140)
HBA1C MFR BLD: 4.8 % (ref 4.2–6.3)
HCT VFR BLD AUTO: 40.9 % (ref 36.5–49.3)
HGB BLD-MCNC: 13.9 G/DL (ref 12–17)
LYMPHOCYTES # BLD AUTO: 0.87 THOUSAND/UL (ref 0.6–4.47)
LYMPHOCYTES # BLD AUTO: 26 % (ref 14–44)
MAGNESIUM SERPL-MCNC: 1.9 MG/DL (ref 1.6–2.6)
MCH RBC QN AUTO: 29.7 PG (ref 26.8–34.3)
MCHC RBC AUTO-ENTMCNC: 34 G/DL (ref 31.4–37.4)
MCV RBC AUTO: 87 FL (ref 82–98)
MONOCYTES # BLD AUTO: 0.8 THOUSAND/UL (ref 0–1.22)
MONOCYTES NFR BLD: 24 % (ref 4–12)
NEUTROPHILS # BLD MANUAL: 1.34 THOUSAND/UL (ref 1.85–7.62)
NEUTS BAND NFR BLD MANUAL: 4 % (ref 0–8)
NEUTS SEG NFR BLD AUTO: 36 % (ref 43–75)
NRBC BLD AUTO-RTO: 0 /100 WBCS
PHOSPHATE SERPL-MCNC: 2.6 MG/DL (ref 2.7–4.5)
PLATELET # BLD AUTO: 168 THOUSANDS/UL (ref 149–390)
PLATELET BLD QL SMEAR: ADEQUATE
PMV BLD AUTO: 9.8 FL (ref 8.9–12.7)
POTASSIUM SERPL-SCNC: 3.7 MMOL/L (ref 3.5–5.3)
PREALB SERPL-MCNC: 14.3 MG/DL (ref 18–40)
PROT SERPL-MCNC: 6 G/DL (ref 6.4–8.2)
RBC # BLD AUTO: 4.68 MILLION/UL (ref 3.88–5.62)
RH BLD: NEGATIVE
SODIUM SERPL-SCNC: 141 MMOL/L (ref 136–145)
SPECIMEN EXPIRATION DATE: NORMAL
TOTAL CELLS COUNTED SPEC: 100
VARIANT LYMPHS # BLD AUTO: 5 %
WBC # BLD AUTO: 3.34 THOUSAND/UL (ref 4.31–10.16)

## 2019-10-17 PROCEDURE — 99232 SBSQ HOSP IP/OBS MODERATE 35: CPT | Performed by: NURSE PRACTITIONER

## 2019-10-17 PROCEDURE — 85007 BL SMEAR W/DIFF WBC COUNT: CPT | Performed by: PHYSICIAN ASSISTANT

## 2019-10-17 PROCEDURE — 85027 COMPLETE CBC AUTOMATED: CPT | Performed by: PHYSICIAN ASSISTANT

## 2019-10-17 PROCEDURE — 99232 SBSQ HOSP IP/OBS MODERATE 35: CPT | Performed by: SURGERY

## 2019-10-17 PROCEDURE — 87493 C DIFF AMPLIFIED PROBE: CPT | Performed by: CLINICAL NURSE SPECIALIST

## 2019-10-17 PROCEDURE — 86900 BLOOD TYPING SEROLOGIC ABO: CPT | Performed by: PHYSICIAN ASSISTANT

## 2019-10-17 PROCEDURE — 82330 ASSAY OF CALCIUM: CPT | Performed by: PHYSICIAN ASSISTANT

## 2019-10-17 PROCEDURE — 80053 COMPREHEN METABOLIC PANEL: CPT | Performed by: PHYSICIAN ASSISTANT

## 2019-10-17 PROCEDURE — 74250 X-RAY XM SM INT 1CNTRST STD: CPT

## 2019-10-17 PROCEDURE — 84134 ASSAY OF PREALBUMIN: CPT | Performed by: PHYSICIAN ASSISTANT

## 2019-10-17 PROCEDURE — 83036 HEMOGLOBIN GLYCOSYLATED A1C: CPT | Performed by: PHYSICIAN ASSISTANT

## 2019-10-17 PROCEDURE — 86850 RBC ANTIBODY SCREEN: CPT | Performed by: PHYSICIAN ASSISTANT

## 2019-10-17 PROCEDURE — 86901 BLOOD TYPING SEROLOGIC RH(D): CPT | Performed by: PHYSICIAN ASSISTANT

## 2019-10-17 PROCEDURE — 83735 ASSAY OF MAGNESIUM: CPT | Performed by: PHYSICIAN ASSISTANT

## 2019-10-17 PROCEDURE — 84100 ASSAY OF PHOSPHORUS: CPT | Performed by: PHYSICIAN ASSISTANT

## 2019-10-17 RX ORDER — ENALAPRILAT 2.5 MG/2ML
0.62 INJECTION INTRAVENOUS EVERY 6 HOURS
Status: DISCONTINUED | OUTPATIENT
Start: 2019-10-18 | End: 2019-10-20 | Stop reason: HOSPADM

## 2019-10-17 RX ADMIN — HEPARIN SODIUM 5000 UNITS: 5000 INJECTION INTRAVENOUS; SUBCUTANEOUS at 14:00

## 2019-10-17 RX ADMIN — LISINOPRIL 20 MG: 20 TABLET ORAL at 08:35

## 2019-10-17 RX ADMIN — HEPARIN SODIUM 5000 UNITS: 5000 INJECTION INTRAVENOUS; SUBCUTANEOUS at 21:32

## 2019-10-17 RX ADMIN — FAMOTIDINE 20 MG: 10 INJECTION, SOLUTION INTRAVENOUS at 08:36

## 2019-10-17 RX ADMIN — HEPARIN SODIUM 5000 UNITS: 5000 INJECTION INTRAVENOUS; SUBCUTANEOUS at 05:58

## 2019-10-17 RX ADMIN — DEXTROSE, SODIUM CHLORIDE, AND POTASSIUM CHLORIDE 75 ML/HR: 5; .45; .15 INJECTION INTRAVENOUS at 08:36

## 2019-10-17 RX ADMIN — FAMOTIDINE 20 MG: 10 INJECTION, SOLUTION INTRAVENOUS at 17:13

## 2019-10-17 RX ADMIN — PRAVASTATIN SODIUM 40 MG: 40 TABLET ORAL at 17:13

## 2019-10-17 NOTE — PLAN OF CARE
Problem: Potential for Falls  Goal: Patient will remain free of falls  Description  INTERVENTIONS:  - Assess patient frequently for physical needs  -  Identify cognitive and physical deficits and behaviors that affect risk of falls    -  [place] fall precautions as indicated by assessment   - Educate patient/family on patient safety including physical limitations  - Instruct patient to call for assistance with activity based on assessment  - Modify environment to reduce risk of injury  - Consider OT/PT consult to assist with strengthening/mobility  Outcome: Progressing     Problem: PAIN - ADULT  Goal: Verbalizes/displays adequate comfort level or baseline comfort level  Description  Interventions:  - Encourage patient to monitor pain and request assistance  - Assess pain using appropriate pain scale  - Administer analgesics based on type and severity of pain and evaluate response  - Implement non-pharmacological measures as appropriate and evaluate response  - Consider cultural and social influences on pain and pain management  - Notify physician/advanced practitioner if interventions unsuccessful or patient reports new pain  Outcome: Progressing     Problem: INFECTION - ADULT  Goal: Absence or prevention of progression during hospitalization  Description  INTERVENTIONS:  - Assess and monitor for signs and symptoms of infection  - Monitor lab/diagnostic results  - Monitor all insertion sites, i e  indwelling lines, tubes, and drains  - Monitor endotracheal if appropriate and nasal secretions for changes in amount and color  - [place] appropriate cooling/warming therapies per order  - Administer medications as ordered  - Instruct and encourage patient and family to use good hand hygiene technique  - Identify and instruct in appropriate isolation precautions for identified infection/condition  Outcome: Progressing  Goal: Absence of fever/infection during neutropenic period  Description  INTERVENTIONS:  - Monitor WBC    Outcome: Progressing     Problem: SAFETY ADULT  Goal: Maintain or return to baseline ADL function  Description  INTERVENTIONS:  -  Assess patient's ability to carry out ADLs; assess patient's baseline for ADL function and identify physical deficits which impact ability to perform ADLs (bathing, care of mouth/teeth, toileting, grooming, dressing, etc )  - Assess/evaluate cause of self-care deficits   - Assess range of motion  - Assess patient's mobility; develop plan if impaired  - Assess patient's need for assistive devices and provide as appropriate  - Encourage maximum independence but intervene and supervise when necessary  - Involve family in performance of ADLs  - Assess for home care needs following discharge   - Consider OT consult to assist with ADL evaluation and planning for discharge  - Provide patient education as appropriate  Outcome: Progressing  Goal: Maintain or return mobility status to optimal level  Description  INTERVENTIONS:  - Assess patient's baseline mobility status (ambulation, transfers, stairs, etc )    - Identify cognitive and physical deficits and behaviors that affect mobility  - Identify mobility aids required to assist with transfers and/or ambulation (gait belt, sit-to-stand, lift, walker, cane, etc )  - [place] fall precautions as indicated by assessment  - Record patient progress and toleration of activity level on Mobility SBAR; progress patient to next Phase/Stage  - Instruct patient to call for assistance with activity based on assessment  - Consider rehabilitation consult to assist with strengthening/weightbearing, etc   Outcome: Progressing     Problem: DISCHARGE PLANNING  Goal: Discharge to home or other facility with appropriate resources  Description  INTERVENTIONS:  - Identify barriers to discharge w/patient and caregiver  - Arrange for needed discharge resources and transportation as appropriate  - Identify discharge learning needs (meds, wound care, etc )  - Arrange for interpretive services to assist at discharge as needed  - Refer to Case Management Department for coordinating discharge planning if the patient needs post-hospital services based on physician/advanced practitioner order or complex needs related to functional status, cognitive ability, or social support system  Outcome: Progressing     Problem: Knowledge Deficit  Goal: Patient/family/caregiver demonstrates understanding of disease process, treatment plan, medications, and discharge instructions  Description  Complete learning assessment and assess knowledge base    Interventions:  - Provide teaching at level of understanding  - Provide teaching via preferred learning methods  Outcome: Progressing

## 2019-10-17 NOTE — PROGRESS NOTES
[facility]  Progress Note - Taina Kill 1960, 61 y o  male MRN: [id number]    Unit/Bed#: -01 Encounter: [id number]    Primary Care Provider: Ivan Toledo DO   Date and time admitted to hospital: 10/14/2019 12:36 AM    SIRS (systemic inflammatory response syndrome)  Assessment & Plan  · Pt presented with SIRS criteria on admisison with tachycardia, tachypnea and lactic acidosis which resolved with IVF hydration   · Likely in setting of acute SBO  · Blood cultures negative x 48 hours, no clinical suspicion for any acute bacterial infectious process  · Procalcitonin negative  · Continue supportive treatment for acute SBO as above, NG tube replaced this morning      Hyperlipidemia  Assessment & Plan  · Resume pravastatin 40 mg daily as patient able to tolerate PO    Essential hypertension  Assessment & Plan  · BP has remained stable here on review  · NG tube replaced today  · Discontinued lisinopril and resumed enalapril   · Continue PRN lopressor for SBP >160   · Continue pain control, pain appears adequately controlled at this time    * Small bowel obstruction   Assessment & Plan  · Pt presented with abdominal pain, nausea and vomiting  · CT scan on admission with high grade bowel obstruction   · General surgery is primary service  · NGT removed on 10/15, replaced on 10/17  · Plan to repeat XR obstruction series today, results pending  · Continue surgical soft diet, tolerating well, advance per surgery   · Serial abdominal exams and ambulation            Primary Service: Des Carmona MD  Reason for Consultation:  Medical conditions    Education and Discussions with Family / Patient:  Discussed plan of care with patient verbalized her understanding denies any additional questions or concerns at this time  Was care plan discussed with the Primary service today?: Yes  Is patient acceptable for discharge from medicine standpoint?: Yes  Discharge Recommendations:  Resume home medications    Time Spent for Care: 15 minutes  More than 50% of total time spent on counseling and coordination of care as described above  VTE Pharmacologic Prophylaxis: Heparin / sequential compression device    Subjective:   Denies any chest pain chest tightness shortness of breath or difficulty breathing  He does reports that he is hungry, he reports decreased flatus  Denies any nausea vomiting abdominal pain    Objective:     Vitals:   Temp (24hrs), Av 4 °F (36 9 °C), Min:98 1 °F (36 7 °C), Max:98 6 °F (37 °C)    Temp:  [98 1 °F (36 7 °C)-98 6 °F (37 °C)] 98 4 °F (36 9 °C)  HR:  [59-76] 59  Resp:  [18-20] 18  BP: (119-146)/(72-92) 119/72  SpO2:  [97 %-98 %] 98 %  Body mass index is 23 11 kg/m²  Input and Output Summary (last 24 hours): Intake/Output Summary (Last 24 hours) at 10/17/2019 1453  Last data filed at 10/17/2019 1356  Gross per 24 hour   Intake    Output 1000 ml   Net -1000 ml       Physical Exam:     Physical Exam   Constitutional: He is oriented to person, place, and time  He appears well-developed and well-nourished  HENT:   Head: Normocephalic  Eyes: Pupils are equal, round, and reactive to light  Neck: Normal range of motion  Cardiovascular: Normal rate  Pulmonary/Chest: Effort normal    Abdominal: Soft  Bowel sounds are normal    Musculoskeletal: Normal range of motion  Neurological: He is alert and oriented to person, place, and time  Skin: Skin is warm and dry  Psychiatric: He has a normal mood and affect  His behavior is normal  Thought content normal    Nursing note and vitals reviewed      Additional Data:     Results from last 7 days   Lab Units 10/17/19  0600  10/15/19  0645   WBC Thousand/uL 3 34*   < > 5 42   HEMOGLOBIN g/dL 13 9   < > 14 6   HEMATOCRIT % 40 9   < > 44 4   PLATELETS Thousands/uL 168   < > 181   NEUTROS PCT %  --   --  60   LYMPHS PCT %  --   --  13*   LYMPHO PCT % 26  --   --    MONOS PCT %  --   --  22*   MONO PCT % 24*  --   -- EOS PCT % 5  --  4    < > = values in this interval not displayed  Results from last 7 days   Lab Units 10/17/19  0600   POTASSIUM mmol/L 3 7   CHLORIDE mmol/L 108   CO2 mmol/L 22   BUN mg/dL 12   CREATININE mg/dL 0 85   CALCIUM mg/dL 8 2*   ALK PHOS U/L 83   ALT U/L 9*   AST U/L 15           * I Have Reviewed All Lab Data Listed Above  * Additional Pertinent Lab Tests Reviewed: [place] [facility] Admission Reviewed    Imaging: I have personally reviewed pertinent reports  Last 24 Hours Medication List:     Current Facility-Administered Medications:  dextrose 5 % and sodium chloride 0 45 % with KCl 20 mEq/L 75 mL/hr Intravenous Continuous Cassandra Junior PA-C Last Rate: 75 mL/hr (10/17/19 0836)   enalaprilat 0 625 mg Intravenous Q6H OSCAR Ibrahim    famotidine 20 mg Intravenous BID Allyssa Hand MD    heparin (porcine) 5,000 Units Subcutaneous [facility] Allyssa Hand MD    metoprolol 5 mg Intravenous Q6H PRN Romelia Armendariz MD    morphine injection 2 mg Intravenous Q1H PRN Allyssa Hand MD    ondansetron 4 mg Intravenous Q6H PRN Allysas Hand MD    phenol 1 spray Mouth/Throat Q2H PRN Allyssa Hand MD    pravastatin 40 mg Oral Daily With Dinner Levi Ferreira PA-C         Today, Patient Was Seen By: OSCAR Ibrahim    ** Please Note: This note has been constructed using a voice recognition system   **

## 2019-10-17 NOTE — ASSESSMENT & PLAN NOTE
· Pt presented with SIRS criteria on admisison with tachycardia, tachypnea and lactic acidosis which resolved with IVF hydration   · Likely in setting of acute SBO  · Blood cultures negative x 48 hours, no clinical suspicion for any acute bacterial infectious process  · Procalcitonin negative  · Continue supportive treatment for acute SBO as above, NG tube replaced this morning

## 2019-10-17 NOTE — ASSESSMENT & PLAN NOTE
· Pt presented with abdominal pain, nausea and vomiting  · CT scan on admission with high grade bowel obstruction   · General surgery is primary service  · NGT removed on 10/15, replaced on 10/17  · Plan to repeat XR obstruction series today, results pending  · Continue surgical soft diet, tolerating well, advance per surgery   · Serial abdominal exams and ambulation

## 2019-10-17 NOTE — ASSESSMENT & PLAN NOTE
· BP has remained stable here on review  · NG tube replaced today  · Discontinued lisinopril and resumed enalapril   · Continue PRN lopressor for SBP >160   · Continue pain control, pain appears adequately controlled at this time

## 2019-10-17 NOTE — PROGRESS NOTES
Progress Note - General Surgery   Palak Sanchez 61 y o  male MRN: [id number]  Unit/Bed#: -01 Encounter: [id number]      Assessment:   40-year-old male with SBO versus enteritis  - follow-up instruction series performed yesterday showed dilated small-bowel loops with differential air-fluid levels suggesting mechanical small-bowel obstruction  There are new air fluid level seen in the right and left side of the abdomen suggesting worsening  - patient was made NPO and NGT was reinserted  - patient reports his abdominal pain continues to be much better than when he 1st came in  - abdomen is soft and only slightly distended  - He is afebrile WBC is within normal limits  - preop labs have been ordered in case the patient needs to have surgical intervention    Plan:  - patient scheduled for small-bowel follow-through today - will wait for results for further recommendations  - continue NPO and NGT with low intermittent wall suction  - encourage ambulation  - DVT prophylaxis      Subjective/Objective     Subjective:  No acute events overnight  Patient states that he had a loose bowel movement yesterday, but has not had any flatus or bowel movement since  Patient was tolerating diet without any vomiting, but obstruction series showed worsening of small-bowel distention the patient has been NPO since; at this time the patient denies any nausea  Patient is ambulating and urinating without difficulty  Patient denies any chest pain, shortness of breath, palpitations, fevers, chills, melena, or hematochezia  Objective:     Blood pressure 119/72, pulse 59, temperature 98 4 °F (36 9 °C), resp  rate 18, height 5' 9" (1 753 m), weight 71 kg (156 lb 8 4 oz), SpO2 98 %  Body mass index is 23 11 kg/m²  I/O       10/15 0701 - 10/16 0700 10/16 0701 - 10/17 0700 10/17 0701 - 10/18 0700    P  O  720 480     I V  (mL/kg) 1479 2 (20 8)      IV Piggyback       Total Intake(mL/kg) 2199 2 (31) 480 (6 8)     Urine (mL/kg/hr) 400 (0 2) 600 (0 4)     Emesis/NG output       Stool       Total Output 400 600     Net +1799 2 -120            Unmeasured Urine Occurrence 5 x            Invasive Devices     Peripheral Intravenous Line            Peripheral IV 10/16/19 Right Forearm 1 day          Drain            NG/OG/Enteral Tube -- days    NG/OG/Enteral Tube Nasogastric 12 Fr Left nares less than 1 day                Physical Exam: /72   Pulse 59   Temp 98 4 °F (36 9 °C)   Resp 18   Ht 5' 9" (1 753 m)   Wt 71 kg (156 lb 8 4 oz)   SpO2 98%   BMI 23 11 kg/m²   General appearance: alert and oriented, in no acute distress  Lungs: clear to auscultation bilaterally  Heart: regular rate and rhythm  Abdomen: Soft, tympanic, nontender to palpation, slightly distended, hypoactive bowel sounds,  Extremities: extremities normal, warm and well-perfused; no cyanosis, clubbing, or edema    Labs   Recent Results (from the past 24 hour(s))   Phosphorus    Collection Time: 10/17/19  6:00 AM   Result Value Ref Range    Phosphorus 2 6 (L) 2 7 - 4 5 mg/dL   Magnesium    Collection Time: 10/17/19  6:00 AM   Result Value Ref Range    Magnesium 1 9 1 6 - 2 6 mg/dL   Calcium, ionized    Collection Time: 10/17/19  6:00 AM   Result Value Ref Range    Calcium, Ionized 1 12 1 12 - 1 32 mmol/L   CBC and differential    Collection Time: 10/17/19  6:00 AM   Result Value Ref Range    WBC 3 34 (L) 4 31 - 10 16 Thousand/uL    RBC 4 68 3 88 - 5 62 Million/uL    Hemoglobin 13 9 12 0 - 17 0 g/dL    Hematocrit 40 9 36 5 - 49 3 %    MCV 87 82 - 98 fL    MCH 29 7 26 8 - 34 3 pg    MCHC 34 0 31 4 - 37 4 g/dL    RDW 13 3 11 6 - 15 1 %    MPV 9 8 8 9 - 12 7 fL    Platelets 174 316 - 893 Thousands/uL    nRBC 0 /100 WBCs   Comprehensive metabolic panel    Collection Time: 10/17/19  6:00 AM   Result Value Ref Range    Sodium 141 136 - 145 mmol/L    Potassium 3 7 3 5 - 5 3 mmol/L    Chloride 108 100 - 108 mmol/L    CO2 22 21 - 32 mmol/L    ANION GAP 11 4 - 13 mmol/L    BUN 12 5 - 25 mg/dL    Creatinine 0 85 0 60 - 1 30 mg/dL    Glucose 86 65 - 140 mg/dL    Calcium 8 2 (L) 8 3 - 10 1 mg/dL    AST 15 5 - 45 U/L    ALT 9 (L) 12 - 78 U/L    Alkaline Phosphatase 83 46 - 116 U/L    Total Protein 6 0 (L) 6 4 - 8 2 g/dL    Albumin 2 7 (L) 3 5 - 5 0 g/dL    Total Bilirubin 0 50 0 20 - 1 00 mg/dL    eGFR 95 ml/min/1 73sq m   Hemoglobin A1C    Collection Time: 10/17/19  6:00 AM   Result Value Ref Range    Hemoglobin A1C 4 8 4 2 - 6 3 %    EAG 91 mg/dl   Type and screen    Collection Time: 10/17/19  6:00 AM   Result Value Ref Range    ABO Grouping O     Rh Factor Negative     Antibody Screen Negative     Specimen Expiration Date 20191020    Manual Differential(PHLEBS Do Not Order)    Collection Time: 10/17/19  6:00 AM   Result Value Ref Range    Segmented % 36 (L) 43 - 75 %    Bands % 4 0 - 8 %    Lymphocytes % 26 14 - 44 %    Monocytes % 24 (H) 4 - 12 %    Eosinophils, % 5 0 - 6 %    Basophils % 0 0 - 1 %    Atypical Lymphocytes % 5 (H) <=0 %    Absolute Neutrophils 1 34 (L) 1 85 - 7 62 Thousand/uL    Lymphocytes Absolute 0 87 0 60 - 4 47 Thousand/uL    Monocytes Absolute 0 80 0 00 - 1 22 Thousand/uL    Eosinophils Absolute 0 17 0 00 - 0 40 Thousand/uL    Basophils Absolute 0 00 0 00 - 0 10 Thousand/uL    Total Counted 100     Platelet Estimate Adequate Adequate        Imaging and other studies:  Xr Abdomen Obstruction Series    Result Date: 10/16/2019  Impression: Dilated small bowel loops with differential air-fluid level suggests mechanical small bowel obstruction  There are new air-fluid level seen in the right and left side of the abdomen suggests worsening  I personally discussed this study with Rona Anderson on 10/16/2019 at 5:25 PM   Workstation performed: [id number]     Xr Abdomen Obstruction Series    Result Date: 10/15/2019  Impression: Slight interval improvement in previously identified small bowel obstruction   Workstation performed: [id number]     Xr Abdomen Obstruction Series    Result Date: 10/14/2019  Impression: Redemonstration of a high-grade small bowel obstruction  Workstation performed: [id number]     Ct Abdomen Pelvis W Contrast    Result Date: 10/14/2019  Impression: High-grade small bowel obstruction  The transition point is identified in the left lower quadrant and there is twisting of the mesentery, raising concern for closed loop small bowel obstruction  Small amount of ascites   Findings are consistent with the preliminary report from [facility] which was provided shortly after completion of the exam  Workstation performed: [id number]       VTE Pharmacologic Prophylaxis: Heparin  VTE Mechanical Prophylaxis: sequential compression device    Jay Disla PA-C  10/17/2019

## 2019-10-18 LAB
ANION GAP SERPL CALCULATED.3IONS-SCNC: 8 MMOL/L (ref 4–13)
BUN SERPL-MCNC: 9 MG/DL (ref 5–25)
C DIFF TOX GENS STL QL NAA+PROBE: NORMAL
CALCIUM SERPL-MCNC: 8.5 MG/DL (ref 8.3–10.1)
CHLORIDE SERPL-SCNC: 109 MMOL/L (ref 100–108)
CO2 SERPL-SCNC: 25 MMOL/L (ref 21–32)
CREAT SERPL-MCNC: 0.95 MG/DL (ref 0.6–1.3)
ERYTHROCYTE [DISTWIDTH] IN BLOOD BY AUTOMATED COUNT: 13.2 % (ref 11.6–15.1)
GFR SERPL CREATININE-BSD FRML MDRD: 87 ML/MIN/1.73SQ M
GLUCOSE SERPL-MCNC: 86 MG/DL (ref 65–140)
HCT VFR BLD AUTO: 39.9 % (ref 36.5–49.3)
HGB BLD-MCNC: 13.7 G/DL (ref 12–17)
MCH RBC QN AUTO: 29.8 PG (ref 26.8–34.3)
MCHC RBC AUTO-ENTMCNC: 34.3 G/DL (ref 31.4–37.4)
MCV RBC AUTO: 87 FL (ref 82–98)
MRSA NOSE QL CULT: NORMAL
PHOSPHATE SERPL-MCNC: 3.1 MG/DL (ref 2.7–4.5)
PLATELET # BLD AUTO: 166 THOUSANDS/UL (ref 149–390)
PMV BLD AUTO: 9.9 FL (ref 8.9–12.7)
POTASSIUM SERPL-SCNC: 3.8 MMOL/L (ref 3.5–5.3)
RBC # BLD AUTO: 4.59 MILLION/UL (ref 3.88–5.62)
SODIUM SERPL-SCNC: 142 MMOL/L (ref 136–145)
WBC # BLD AUTO: 4.19 THOUSAND/UL (ref 4.31–10.16)

## 2019-10-18 PROCEDURE — 84100 ASSAY OF PHOSPHORUS: CPT | Performed by: NURSE PRACTITIONER

## 2019-10-18 PROCEDURE — 85027 COMPLETE CBC AUTOMATED: CPT | Performed by: NURSE PRACTITIONER

## 2019-10-18 PROCEDURE — 99232 SBSQ HOSP IP/OBS MODERATE 35: CPT | Performed by: SURGERY

## 2019-10-18 PROCEDURE — 99232 SBSQ HOSP IP/OBS MODERATE 35: CPT | Performed by: NURSE PRACTITIONER

## 2019-10-18 PROCEDURE — 80048 BASIC METABOLIC PNL TOTAL CA: CPT | Performed by: NURSE PRACTITIONER

## 2019-10-18 RX ORDER — ACETAMINOPHEN 325 MG/1
650 TABLET ORAL EVERY 6 HOURS PRN
Status: DISCONTINUED | OUTPATIENT
Start: 2019-10-18 | End: 2019-10-20 | Stop reason: HOSPADM

## 2019-10-18 RX ORDER — FAMOTIDINE 20 MG/1
20 TABLET, FILM COATED ORAL 2 TIMES DAILY
Status: DISCONTINUED | OUTPATIENT
Start: 2019-10-18 | End: 2019-10-20 | Stop reason: HOSPADM

## 2019-10-18 RX ADMIN — PRAVASTATIN SODIUM 40 MG: 40 TABLET ORAL at 17:23

## 2019-10-18 RX ADMIN — DEXTROSE, SODIUM CHLORIDE, AND POTASSIUM CHLORIDE 75 ML/HR: 5; .45; .15 INJECTION INTRAVENOUS at 13:52

## 2019-10-18 RX ADMIN — ENALAPRILAT 0.62 MG: 1.25 INJECTION INTRAVENOUS at 08:57

## 2019-10-18 RX ADMIN — FAMOTIDINE 20 MG: 20 TABLET ORAL at 17:23

## 2019-10-18 RX ADMIN — ENALAPRILAT 0.62 MG: 1.25 INJECTION INTRAVENOUS at 13:45

## 2019-10-18 RX ADMIN — HEPARIN SODIUM 5000 UNITS: 5000 INJECTION INTRAVENOUS; SUBCUTANEOUS at 21:39

## 2019-10-18 RX ADMIN — ENALAPRILAT 0.62 MG: 1.25 INJECTION INTRAVENOUS at 20:12

## 2019-10-18 RX ADMIN — HEPARIN SODIUM 5000 UNITS: 5000 INJECTION INTRAVENOUS; SUBCUTANEOUS at 13:45

## 2019-10-18 RX ADMIN — FAMOTIDINE 20 MG: 10 INJECTION, SOLUTION INTRAVENOUS at 08:57

## 2019-10-18 RX ADMIN — DEXTROSE, SODIUM CHLORIDE, AND POTASSIUM CHLORIDE 75 ML/HR: 5; .45; .15 INJECTION INTRAVENOUS at 00:15

## 2019-10-18 RX ADMIN — HEPARIN SODIUM 5000 UNITS: 5000 INJECTION INTRAVENOUS; SUBCUTANEOUS at 05:49

## 2019-10-18 NOTE — ASSESSMENT & PLAN NOTE
· Pt presented with abdominal pain, nausea and vomiting  · CT scan on admission with high grade bowel obstruction   · General surgery is primary service  · NGT removed on 10/15, replaced on 10/17  · obstruction series 10/17 revealsDilated small bowel loops with differential air-fluid level suggests mechanical small bowel obstruction    There are new air-fluid level seen in the right and left side of the abdomen suggests worsening  · Patient reports flatus and  bowel movement today  · Serial abdominal exams and ambulation

## 2019-10-18 NOTE — ASSESSMENT & PLAN NOTE
· Pt presented with SIRS criteria on admisison with tachycardia, tachypnea and lactic acidosis which resolved with IVF hydration   · Likely in setting of acute SBO  · Blood cultures negative x 4 days, no clinical suspicion for any acute bacterial infectious process  · Procalcitonin negative  · Continue supportive treatment for acute SBO as above, NG tube replaced 10/17

## 2019-10-18 NOTE — ASSESSMENT & PLAN NOTE
· BP has remained stable here on review  · NG tube replaced 10/17  · Discontinued lisinopril and resumed enalapril   · Continue PRN lopressor for SBP >160   · Continue pain control, pain appears adequately controlled at this time

## 2019-10-18 NOTE — PROGRESS NOTES
[address] Internal Medicine  Progress Note - Dimitry Castañeda 1960, 61 y o  male MRN: [id number]    Unit/Bed#: -01 Encounter: [id number]    Primary Care Provider: Juliana Oconnor DO   Date and time admitted to hospital: 10/14/2019 12:36 AM    SIRS (systemic inflammatory response syndrome)  Assessment & Plan  · Pt presented with SIRS criteria on admisison with tachycardia, tachypnea and lactic acidosis which resolved with IVF hydration   · Likely in setting of acute SBO  · Blood cultures negative x 4 days, no clinical suspicion for any acute bacterial infectious process  · Procalcitonin negative  · Continue supportive treatment for acute SBO as above, NG tube replaced 10/17      Hyperlipidemia  Assessment & Plan  · Resume pravastatin 40 mg daily as patient able to tolerate PO    Essential hypertension  Assessment & Plan  · BP has remained stable here on review  · NG tube replaced 10/17  · Discontinued lisinopril and resumed enalapril   · Continue PRN lopressor for SBP >160   · Continue pain control, pain appears adequately controlled at this time    * Small bowel obstruction   Assessment & Plan  · Pt presented with abdominal pain, nausea and vomiting  · CT scan on admission with high grade bowel obstruction   · General surgery is primary service  · NGT removed on 10/15, replaced on 10/17  · obstruction series 10/17 revealsDilated small bowel loops with differential air-fluid level suggests mechanical small bowel obstruction    There are new air-fluid level seen in the right and left side of the abdomen suggests worsening  · Patient reports flatus and  bowel movement today  · Serial abdominal exams and ambulation          Primary Service: Radha Ca MD  Reason for Consultation:  Medical conditions, hypertension hyperlipidemia    Education and Discussions with Family / Patient:  Discussed plan of care with patient and wife verbalized understanding denied any additional questions or concerns at this time  Was care plan discussed with the Primary service today?: No  Is patient acceptable for discharge from medicine standpoint?: Yes  Discharge Recommendations:  Resume home medications    Time Spent for Care: 15 minutes  More than 50% of total time spent on counseling and coordination of care as described above  VTE Pharmacologic Prophylaxis: Enoxaparin (Lovenox) / sequential compression device    Subjective:   Patient reports that he had flatus and a bowel movement today  Reports that he is hungry  Denies any chest pain chest tightness shortness of breath or difficulty breathing  Denies any headache lightheadedness dizziness or blurry vision    Objective:     Vitals:   Temp (24hrs), Av 4 °F (36 9 °C), Min:98 2 °F (36 8 °C), Max:98 6 °F (37 °C)    Temp:  [98 2 °F (36 8 °C)-98 6 °F (37 °C)] 98 6 °F (37 °C)  HR:  [56-73] 73  Resp:  [17-19] 17  BP: (129-137)/(77-81) 129/77  SpO2:  [96 %-98 %] 97 %  Body mass index is 23 11 kg/m²  Input and Output Summary (last 24 hours): Intake/Output Summary (Last 24 hours) at 10/18/2019 1223  Last data filed at 10/18/2019 0901  Gross per 24 hour   Intake 1000 ml   Output 975 ml   Net 25 ml       Physical Exam:     Physical Exam   Constitutional: He is oriented to person, place, and time  He appears well-developed and well-nourished  HENT:   Head: Normocephalic  Eyes: Pupils are equal, round, and reactive to light  Neck: Normal range of motion  Cardiovascular: Normal rate  Pulmonary/Chest: Effort normal    Abdominal: Soft  Musculoskeletal: Normal range of motion  He exhibits no edema or tenderness  Neurological: He is alert and oriented to person, place, and time  Skin: Skin is warm and dry  Psychiatric: He has a normal mood and affect  His behavior is normal  Judgment and thought content normal    Nursing note and vitals reviewed      Additional Data:     Results from last 7 days   Lab Units 10/18/19  0554 10/17/19  0600  10/15/19  0645   WBC Thousand/uL 4 19* 3 34*   < > 5 42   HEMOGLOBIN g/dL 13 7 13 9   < > 14 6   HEMATOCRIT % 39 9 40 9   < > 44 4   PLATELETS Thousands/uL 166 168   < > 181   NEUTROS PCT %  --   --   --  60   LYMPHS PCT %  --   --   --  13*   LYMPHO PCT %  --  26  --   --    MONOS PCT %  --   --   --  22*   MONO PCT %  --  24*  --   --    EOS PCT %  --  5  --  4    < > = values in this interval not displayed  Results from last 7 days   Lab Units 10/18/19  0554 10/17/19  0600   POTASSIUM mmol/L 3 8 3 7   CHLORIDE mmol/L 109* 108   CO2 mmol/L 25 22   BUN mg/dL 9 12   CREATININE mg/dL 0 95 0 85   CALCIUM mg/dL 8 5 8 2*   ALK PHOS U/L  --  83   ALT U/L  --  9*   AST U/L  --  15           * I Have Reviewed All Lab Data Listed Above  * Additional Pertinent Lab Tests Reviewed: [address] Admission Reviewed    Imaging: I have personally reviewed pertinent reports  Last 24 Hours Medication List:     Current Facility-Administered Medications:  dextrose 5 % and sodium chloride 0 45 % with KCl 20 mEq/L 75 mL/hr Intravenous Continuous Kyung Moscoso PA-C Last Rate: 75 mL/hr (10/18/19 0015)   enalaprilat 0 625 mg Intravenous Q6H OSCAR Guan    famotidine 20 mg Oral BID OSCAR Mays    heparin (porcine) 5,000 Units Subcutaneous [facility] Juliana Roldan MD    metoprolol 5 mg Intravenous Q6H PRN Jayjay Varghese MD    morphine injection 2 mg Intravenous Q1H PRN Juliana Roldan MD    ondansetron 4 mg Intravenous Q6H PRN Juliana Roldan MD    phenol 1 spray Mouth/Throat Q2H PRN Juliana Roldan MD    pravastatin 40 mg Oral Daily With Dinner Ignacia Cabot, PA-C         Today, Patient Was Seen By: OSCAR Mays    ** Please Note: This note has been constructed using a voice recognition system   **

## 2019-10-18 NOTE — PROGRESS NOTES
Progress Note - General Surgery   Melody Muir 61 y o  male MRN: [id number]  Unit/Bed#: -01 Encounter: [id number]    Assessment/Plan  Pt seen by Gladys Stephens at 8:30 am   I saw pt and  Examined 13:43    Pt seen with Dr Rl Light at 3;37 pm     Partial Small Bowel Obstruction   SBFT showed contrast in colon but small caliper  Pt is passing gas, and had 2 bowel movements  Denies pain, N/V     NGT, no recorded output since 10/16  Scant amount in tubing today looks like contrast    No leukocytosis   -d/c NGT  -sips of clears  -ambulate in byrne  -DVT PPX   -cont Pepcid       Chief Complaint: I don't have any pain, I had 2 bowel movements, no bloating, nausea or vomiting  Objective/Exam: pt looks well lying in bed  General Appearance:    Alert and orientated x 3, cooperative, no distress   Lungs:     Clear to auscultation bilaterally, respirations unlabored    Heart:    Regular rate and rhythm   Abdomen:     Soft, nontender, NBS,   ngt with scant amount of contrast material in tubing  Extremities:   Extremities normal,  no cyanosis or edema   Pulses:   2+ and symmetric all extremities, no calf tenderness   Skin:   Skin color, texture, turgor normal, no rashes or lesions   Neurologic:   CNII-XII intact, normal strength, sensation and reflexes     Throughout, affect appropriate       Blood pressure 129/77, pulse 73, temperature 98 6 °F (37 °C), resp  rate 17, height 5' 9" (1 753 m), weight 71 kg (156 lb 8 4 oz), SpO2 97 %  ,Body mass index is 23 11 kg/m²        Intake/Output Summary (Last 24 hours) at 10/18/2019 1344  Last data filed at 10/18/2019 0901  Gross per 24 hour   Intake 1000 ml   Output 975 ml   Net 25 ml       Invasive Devices     Peripheral Intravenous Line            Peripheral IV 10/16/19 Right Forearm 1 day          Drain            NG/OG/Enteral Tube -- days                                        Labs:   CBC with diff:   Lab Results   Component Value Date    WBC 4 19 (L) 10/18/2019    HGB 13 7 10/18/2019    HCT 39 9 10/18/2019    MCV 87 10/18/2019     10/18/2019    MCH 29 8 10/18/2019    MCHC 34 3 10/18/2019    RDW 13 2 10/18/2019    MPV 9 9 10/18/2019    NRBC 0 10/17/2019   ,   BMP/CMP:  Lab Results   Component Value Date    K 3 8 10/18/2019     (H) 10/18/2019    CO2 25 10/18/2019    BUN 9 10/18/2019    CREATININE 0 95 10/18/2019    CALCIUM 8 5 10/18/2019    AST 15 10/17/2019    ALT 9 (L) 10/17/2019    ALKPHOS 83 10/17/2019    EGFR 87 10/18/2019   ,   Lipid Panel: No results found for: CHOL,   Coags: No results found for: PT, PTT, INR,     Blood Culture:   Lab Results   Component Value Date    BLOODCX No Growth After 4 Days  10/14/2019   ,   Urinalysis:   Lab Results   Component Value Date    COLORU Yellow 10/16/2019    CLARITYU Clear 10/16/2019    SPECGRAV 1 025 10/16/2019    PHUR 5 5 10/16/2019    LEUKOCYTESUR Negative 10/16/2019    NITRITE Negative 10/16/2019    GLUCOSEU Negative 10/16/2019    KETONESU 15 (1+) (A) 10/16/2019    BILIRUBINUR Negative 10/16/2019    BLOODU Negative 10/16/2019   ,   Urine Culture: No results found for: URINECX,   Wound Culure: No results found for: WOUNDCULT      Imaging: Xr Abdomen Obstruction Series    Result Date: 10/16/2019  Impression: Dilated small bowel loops with differential air-fluid level suggests mechanical small bowel obstruction  There are new air-fluid level seen in the right and left side of the abdomen suggests worsening  I personally discussed this study with Tho Walters on 10/16/2019 at 5:25 PM   Workstation performed: [id number]     Xr Abdomen Obstruction Series    Result Date: 10/15/2019  Impression: Slight interval improvement in previously identified small bowel obstruction  Workstation performed: [id number]     Xr Abdomen Obstruction Series    Result Date: 10/14/2019  Impression: Redemonstration of a high-grade small bowel obstruction   Workstation performed: [id number]     Fl Small Bowel    Result Date: 10/18/2019  Impression: Dilated small bowel loops the upper and mid abdomen with the small caliber distal ileal loop and delayed passage of contrast from the small bowel into the cecum at 8 hours Findings compatible with partial small bowel obstruction,  I personally discussed this study with Sarbjit Boo on 10/18/2019 at 12:37 PM  Workstation performed: [id number]     Ct Abdomen Pelvis W Contrast    Result Date: 10/14/2019  Impression: High-grade small bowel obstruction  The transition point is identified in the left lower quadrant and there is twisting of the mesentery, raising concern for closed loop small bowel obstruction  Small amount of ascites   Findings are consistent with the preliminary report from [facility] which was provided shortly after completion of the exam  Workstation performed: [id number]         Sarbjit Boo PA-C  10/18/2019

## 2019-10-19 PROBLEM — R65.10 SIRS (SYSTEMIC INFLAMMATORY RESPONSE SYNDROME) (HCC): Status: RESOLVED | Noted: 2019-10-14 | Resolved: 2019-10-19

## 2019-10-19 PROBLEM — K56.609 SMALL BOWEL OBSTRUCTION (HCC): Status: RESOLVED | Noted: 2019-10-14 | Resolved: 2019-10-19

## 2019-10-19 LAB
ANION GAP SERPL CALCULATED.3IONS-SCNC: 8 MMOL/L (ref 4–13)
BACTERIA BLD CULT: NORMAL
BACTERIA BLD CULT: NORMAL
BUN SERPL-MCNC: 9 MG/DL (ref 5–25)
CALCIUM SERPL-MCNC: 8.8 MG/DL (ref 8.3–10.1)
CHLORIDE SERPL-SCNC: 107 MMOL/L (ref 100–108)
CO2 SERPL-SCNC: 26 MMOL/L (ref 21–32)
CREAT SERPL-MCNC: 0.98 MG/DL (ref 0.6–1.3)
ERYTHROCYTE [DISTWIDTH] IN BLOOD BY AUTOMATED COUNT: 13.2 % (ref 11.6–15.1)
GFR SERPL CREATININE-BSD FRML MDRD: 84 ML/MIN/1.73SQ M
GLUCOSE SERPL-MCNC: 97 MG/DL (ref 65–140)
HCT VFR BLD AUTO: 42.8 % (ref 36.5–49.3)
HGB BLD-MCNC: 14.7 G/DL (ref 12–17)
MCH RBC QN AUTO: 29.7 PG (ref 26.8–34.3)
MCHC RBC AUTO-ENTMCNC: 34.3 G/DL (ref 31.4–37.4)
MCV RBC AUTO: 87 FL (ref 82–98)
PLATELET # BLD AUTO: 191 THOUSANDS/UL (ref 149–390)
PMV BLD AUTO: 10.1 FL (ref 8.9–12.7)
POTASSIUM SERPL-SCNC: 4 MMOL/L (ref 3.5–5.3)
RBC # BLD AUTO: 4.95 MILLION/UL (ref 3.88–5.62)
SODIUM SERPL-SCNC: 141 MMOL/L (ref 136–145)
WBC # BLD AUTO: 5.58 THOUSAND/UL (ref 4.31–10.16)

## 2019-10-19 PROCEDURE — 80048 BASIC METABOLIC PNL TOTAL CA: CPT | Performed by: NURSE PRACTITIONER

## 2019-10-19 PROCEDURE — 85027 COMPLETE CBC AUTOMATED: CPT | Performed by: NURSE PRACTITIONER

## 2019-10-19 PROCEDURE — NC001 PR NO CHARGE: Performed by: SURGERY

## 2019-10-19 PROCEDURE — 99238 HOSP IP/OBS DSCHRG MGMT 30/<: CPT | Performed by: SURGERY

## 2019-10-19 RX ORDER — FAMOTIDINE 20 MG/1
20 TABLET, FILM COATED ORAL 2 TIMES DAILY
Qty: 60 TABLET | Refills: 0 | Status: SHIPPED | OUTPATIENT
Start: 2019-10-19 | End: 2019-11-18

## 2019-10-19 RX ORDER — ACETAMINOPHEN 325 MG/1
650 TABLET ORAL EVERY 6 HOURS PRN
Qty: 30 TABLET | Refills: 0 | Status: SHIPPED | OUTPATIENT
Start: 2019-10-19

## 2019-10-19 RX ADMIN — ENALAPRILAT 0.62 MG: 1.25 INJECTION INTRAVENOUS at 13:54

## 2019-10-19 RX ADMIN — DEXTROSE, SODIUM CHLORIDE, AND POTASSIUM CHLORIDE 75 ML/HR: 5; .45; .15 INJECTION INTRAVENOUS at 02:48

## 2019-10-19 RX ADMIN — PRAVASTATIN SODIUM 40 MG: 40 TABLET ORAL at 17:47

## 2019-10-19 RX ADMIN — HEPARIN SODIUM 5000 UNITS: 5000 INJECTION INTRAVENOUS; SUBCUTANEOUS at 22:09

## 2019-10-19 RX ADMIN — FAMOTIDINE 20 MG: 20 TABLET ORAL at 17:47

## 2019-10-19 RX ADMIN — ENALAPRILAT 0.62 MG: 1.25 INJECTION INTRAVENOUS at 19:35

## 2019-10-19 RX ADMIN — ENALAPRILAT 0.62 MG: 1.25 INJECTION INTRAVENOUS at 02:51

## 2019-10-19 RX ADMIN — FAMOTIDINE 20 MG: 20 TABLET ORAL at 08:22

## 2019-10-19 RX ADMIN — HEPARIN SODIUM 5000 UNITS: 5000 INJECTION INTRAVENOUS; SUBCUTANEOUS at 13:54

## 2019-10-19 RX ADMIN — HEPARIN SODIUM 5000 UNITS: 5000 INJECTION INTRAVENOUS; SUBCUTANEOUS at 06:04

## 2019-10-19 RX ADMIN — ENALAPRILAT 0.62 MG: 1.25 INJECTION INTRAVENOUS at 08:22

## 2019-10-19 NOTE — DISCHARGE SUMMARY
Discharge Summary - Cosmo Tinoco 61 y o  male MRN: [id number]    Unit/Bed#: -01 Encounter: [id number]    Admission Date: 10/14/2019     Pt seen with Dr Efrain Pierce at 12:15     Admitting Diagnosis: Vomiting [R11 10]  Small bowel obstruction ([address] ) [O26 243]  Essential hypertension [I10]    HPI: Cosmo Tinoco is a 61y o  year old male with PMHx of HTN and HLD, who presented to the emergency department last night with abdominal pain that radiated across his lower abdomen and was most prominent in the left lower quadrant  Patient states he had had diarrhea most of the day  He denies hematochezia or melena  He states he ate dinner last night and about 15-20 minutes after dinner started experiencing a sudden onset of sharp cramping abdominal pain in his lower abdomen with associated nausea and vomiting  He states he vomited 3 times at home  He denies any hematemesis or coffee-ground in the vomit  He presented to the emergency department he continued to have symptoms  Patient states that he is feeling better this morning but that he did not start to feel relief until after placement of the NG tube  Patient states the nature of his pain is changed and he is now just generally sore  Patient denies any unusual food intake  He states the only item of food he ate in the last couple days that was not from his home was a wall while soft pretzel  He denies any recent sick contacts  He did size any family members or people who ate similar to him with similar symptoms  He denies any fever or chills  He denies chest pain, palpitations or shortness of breath  His only abdominal surgical history is laparoscopic cholecystectomy  Hospital Course: pt was admitted from the ED and made NPO, IVF and pain control  NGT was inserted which resolved his abd pain  Lactic acid resolved  The NGT was removed and he was started CLD    He did not tolerate this well and diet was backed down, Obstruction series revealed worsening of obstruction and NGT was reinserted  His abdominal pain again decreased significantly,  He was passing gas  Discussion of surgical intervention was had but would see results for SBFT to make the final decision  The following morning SBFT started and at 8 hrs there was contrast in the colon  Following oral contrast he was passing gas and had 2 bowel movements  The pain had resolved  NGT was removed and he was started on CLD and kept him on it overnight with the plan to advance him slowly  He was started on soft surgical low residual diet and he was tolerating this  On the day of discharge he was reluctant to eat lunch and preferred to wait until after dinner to be discharge  He will go home with family care, he will follow a low residual diet until seen in the office  He was given discharge instructions to follow diet, make an appointment in the office in 2 weeks and to drop his diet back to clear liquids or nothing to eat and call the office if symptoms returned  His wife was present for discussion     Lungs clear  RRR  Abd is soft, NT, NBS,   No calf tenderness  Significant Findings, Care, Treatment and Services Provided: see reports of imaging     Complications: none     Discharge Diagnosis: partial small bowel obstruction     Condition at Discharge: good     Discharge instructions/Information to patient and family:   See after visit summary for information provided to patient and family  Provisions for Follow-Up Care:  See after visit summary for information related to follow-up care and any pertinent home health orders  Disposition: See After Visit Summary for discharge disposition information  Planned Readmission: No    Discharge Statement   I spent 30 minutes discharging the patient  This time was spent on the day of discharge  I had direct contact with the patient on the day of discharge   Additional documentation is required if more than 30 minutes were spent on discharge  Discharge Medications:  See after visit summary for reconciled discharge medications provided to patient and family

## 2019-10-19 NOTE — PROGRESS NOTES
Progress Note - General Surgery   Marcello Hanson 61 y o  male MRN: [id number]  Unit/Bed#: -01 Encounter: [id number]    Assessment/Plan  Partial SBO vs enteritis  Pt SBFT yesterday with contrast in the colon,  No complaints of abd pain, n/v/ having bowel movements, and passing gas  Tolerating CLD,   -advance to surgical soft diet    -discharge this evening if tolerating diet  Chief Complaint: I am feeling fine, having bowel movements and no abd pain  Objective/Exam:      General Appearance:    Alert and orientated x 3, cooperative, no distress   Lungs:     Clear to auscultation bilaterally, respirations unlabored    Heart:    Regular rate and rhythm   Abdomen:        soft, NT, NBS,       Extremities:   Extremities normal,  no cyanosis or edema   Pulses:   2+ and symmetric all extremities, no calf tenderness   Skin:   Skin color, texture, turgor normal, no rashes or lesions   Neurologic:   CNII-XII intact, normal strength, sensation and reflexes     Throughout, affect appropriate       Blood pressure 130/76, pulse 89, temperature 98 5 °F (36 9 °C), resp  rate 17, height 5' 9" (1 753 m), weight 70 8 kg (156 lb), SpO2 97 %  ,Body mass index is 23 04 kg/m²        Intake/Output Summary (Last 24 hours) at 10/19/2019 1356  Last data filed at 10/19/2019 1303  Gross per 24 hour   Intake 720 ml   Output 1200 ml   Net -480 ml       Invasive Devices     Peripheral Intravenous Line            Peripheral IV 10/16/19 Right Forearm 2 days                                        Labs:   CBC with diff:   Lab Results   Component Value Date    WBC 5 58 10/19/2019    HGB 14 7 10/19/2019    HCT 42 8 10/19/2019    MCV 87 10/19/2019     10/19/2019    MCH 29 7 10/19/2019    MCHC 34 3 10/19/2019    RDW 13 2 10/19/2019    MPV 10 1 10/19/2019    NRBC 0 10/17/2019   ,   BMP/CMP:  Lab Results   Component Value Date    K 4 0 10/19/2019     10/19/2019    CO2 26 10/19/2019    BUN 9 10/19/2019    CREATININE 0 98 10/19/2019    CALCIUM 8 8 10/19/2019    AST 15 10/17/2019    ALT 9 (L) 10/17/2019    ALKPHOS 83 10/17/2019    EGFR 84 10/19/2019   ,   Lipid Panel: No results found for: CHOL,   Coags: No results found for: PT, PTT, INR,     Blood Culture:   Lab Results   Component Value Date    BLOODCX No Growth After 5 Days  10/14/2019   ,   Urinalysis:   Lab Results   Component Value Date    COLORU Yellow 10/16/2019    CLARITYU Clear 10/16/2019    SPECGRAV 1 025 10/16/2019    PHUR 5 5 10/16/2019    LEUKOCYTESUR Negative 10/16/2019    NITRITE Negative 10/16/2019    GLUCOSEU Negative 10/16/2019    KETONESU 15 (1+) (A) 10/16/2019    BILIRUBINUR Negative 10/16/2019    BLOODU Negative 10/16/2019   ,   Urine Culture: No results found for: URINECX,   Wound Culure: No results found for: WOUNDCULT      Imaging: Xr Abdomen Obstruction Series    Result Date: 10/16/2019  Impression: Dilated small bowel loops with differential air-fluid level suggests mechanical small bowel obstruction  There are new air-fluid level seen in the right and left side of the abdomen suggests worsening  I personally discussed this study with Trista Sadler on 10/16/2019 at 5:25 PM   Workstation performed: [id number]     Xr Abdomen Obstruction Series    Result Date: 10/15/2019  Impression: Slight interval improvement in previously identified small bowel obstruction  Workstation performed: [id number]     Xr Abdomen Obstruction Series    Result Date: 10/14/2019  Impression: Redemonstration of a high-grade small bowel obstruction   Workstation performed: [id number]     Fl Small Bowel    Result Date: 10/18/2019  Impression: Dilated small bowel loops the upper and mid abdomen with the small caliber distal ileal loop and delayed passage of contrast from the small bowel into the cecum at 8 hours Findings compatible with partial small bowel obstruction,  I personally discussed this study with Kathy Lozoya on 10/18/2019 at 12:37 PM  Workstation performed: [id number] Ct Abdomen Pelvis W Contrast    Result Date: 10/14/2019  Impression: High-grade small bowel obstruction  The transition point is identified in the left lower quadrant and there is twisting of the mesentery, raising concern for closed loop small bowel obstruction  Small amount of ascites   Findings are consistent with the preliminary report from [facility] which was provided shortly after completion of the exam  Workstation performed: [id number]         Pablito Ludwig PA-C  10/19/2019

## 2019-10-19 NOTE — NUTRITION
Please consider adding strawberry ensure enlive at breakfast and chocolate ensure enlive at dinner  RD does not have protocol to add supplement  Low Fiber Nutrition education and handout provided

## 2019-10-19 NOTE — PLAN OF CARE
Problem: Potential for Falls  Goal: Patient will remain free of falls  Description  INTERVENTIONS:  - Assess patient frequently for physical needs  -  Identify cognitive and physical deficits and behaviors that affect risk of falls    -  [place] fall precautions as indicated by assessment   - Educate patient/family on patient safety including physical limitations  - Instruct patient to call for assistance with activity based on assessment  - Modify environment to reduce risk of injury  - Consider OT/PT consult to assist with strengthening/mobility  Outcome: Progressing     Problem: PAIN - ADULT  Goal: Verbalizes/displays adequate comfort level or baseline comfort level  Description  Interventions:  - Encourage patient to monitor pain and request assistance  - Assess pain using appropriate pain scale  - Administer analgesics based on type and severity of pain and evaluate response  - Implement non-pharmacological measures as appropriate and evaluate response  - Consider cultural and social influences on pain and pain management  - Notify physician/advanced practitioner if interventions unsuccessful or patient reports new pain  Outcome: Progressing     Problem: INFECTION - ADULT  Goal: Absence or prevention of progression during hospitalization  Description  INTERVENTIONS:  - Assess and monitor for signs and symptoms of infection  - Monitor lab/diagnostic results  - Monitor all insertion sites, i e  indwelling lines, tubes, and drains  - Monitor endotracheal if appropriate and nasal secretions for changes in amount and color  - [place] appropriate cooling/warming therapies per order  - Administer medications as ordered  - Instruct and encourage patient and family to use good hand hygiene technique  - Identify and instruct in appropriate isolation precautions for identified infection/condition  Outcome: Progressing     Problem: SAFETY ADULT  Goal: Maintain or return to baseline ADL function  Description  INTERVENTIONS:  -  Assess patient's ability to carry out ADLs; assess patient's baseline for ADL function and identify physical deficits which impact ability to perform ADLs (bathing, care of mouth/teeth, toileting, grooming, dressing, etc )  - Assess/evaluate cause of self-care deficits   - Assess range of motion  - Assess patient's mobility; develop plan if impaired  - Assess patient's need for assistive devices and provide as appropriate  - Encourage maximum independence but intervene and supervise when necessary  - Involve family in performance of ADLs  - Assess for home care needs following discharge   - Consider OT consult to assist with ADL evaluation and planning for discharge  - Provide patient education as appropriate  Outcome: Progressing  Goal: Maintain or return mobility status to optimal level  Description  INTERVENTIONS:  - Assess patient's baseline mobility status (ambulation, transfers, stairs, etc )    - Identify cognitive and physical deficits and behaviors that affect mobility  - Identify mobility aids required to assist with transfers and/or ambulation (gait belt, sit-to-stand, lift, walker, cane, etc )  - [place] fall precautions as indicated by assessment  - Record patient progress and toleration of activity level on Mobility SBAR; progress patient to next Phase/Stage  - Instruct patient to call for assistance with activity based on assessment  - Consider rehabilitation consult to assist with strengthening/weightbearing, etc   Outcome: Progressing     Problem: DISCHARGE PLANNING  Goal: Discharge to home or other facility with appropriate resources  Description  INTERVENTIONS:  - Identify barriers to discharge w/patient and caregiver  - Arrange for needed discharge resources and transportation as appropriate  - Identify discharge learning needs (meds, wound care, etc )  - Arrange for interpretive services to assist at discharge as needed  - Refer to Case Management Department for coordinating discharge planning if the patient needs post-hospital services based on physician/advanced practitioner order or complex needs related to functional status, cognitive ability, or social support system  Outcome: Progressing     Problem: Knowledge Deficit  Goal: Patient/family/caregiver demonstrates understanding of disease process, treatment plan, medications, and discharge instructions  Description  Complete learning assessment and assess knowledge base    Interventions:  - Provide teaching at level of understanding  - Provide teaching via preferred learning methods  Outcome: Progressing     Problem: Prexisting or High Potential for Compromised Skin Integrity  Goal: Skin integrity is maintained or improved  Description  INTERVENTIONS:  - Identify patients at risk for skin breakdown  - Assess and monitor skin integrity  - Assess and monitor nutrition and hydration status  - Monitor labs   - Assess for incontinence   - Turn and reposition patient  - Assist with mobility/ambulation  - Relieve pressure over bony prominences  - Avoid friction and shearing  - Provide appropriate hygiene as needed including keeping skin clean and dry  - Evaluate need for skin moisturizer/barrier cream  - Collaborate with interdisciplinary team   - Patient/family teaching  - Consider wound care consult   Outcome: Progressing

## 2019-10-19 NOTE — DISCHARGE INSTRUCTIONS
Call the surgery office for appointment in 2 weeks  Call the office if you have increase in abd pain, nausea, vomiting, unable to pas gas and become bloated or do not  have bowel movement for several days   Continue to drink Ensure with meals

## 2019-10-20 VITALS
DIASTOLIC BLOOD PRESSURE: 75 MMHG | TEMPERATURE: 98.3 F | OXYGEN SATURATION: 99 % | WEIGHT: 156 LBS | HEIGHT: 69 IN | SYSTOLIC BLOOD PRESSURE: 123 MMHG | HEART RATE: 78 BPM | RESPIRATION RATE: 17 BRPM | BODY MASS INDEX: 23.11 KG/M2

## 2019-10-20 RX ADMIN — FAMOTIDINE 20 MG: 20 TABLET ORAL at 09:25

## 2019-10-20 RX ADMIN — ENALAPRILAT 0.62 MG: 1.25 INJECTION INTRAVENOUS at 02:02

## 2019-10-20 RX ADMIN — HEPARIN SODIUM 5000 UNITS: 5000 INJECTION INTRAVENOUS; SUBCUTANEOUS at 05:39

## 2019-10-20 NOTE — PLAN OF CARE
Problem: Potential for Falls  Goal: Patient will remain free of falls  Description  INTERVENTIONS:  - Assess patient frequently for physical needs  -  Identify cognitive and physical deficits and behaviors that affect risk of falls    -  [place] fall precautions as indicated by assessment   - Educate patient/family on patient safety including physical limitations  - Instruct patient to call for assistance with activity based on assessment  - Modify environment to reduce risk of injury  - Consider OT/PT consult to assist with strengthening/mobility  Outcome: Progressing     Problem: PAIN - ADULT  Goal: Verbalizes/displays adequate comfort level or baseline comfort level  Description  Interventions:  - Encourage patient to monitor pain and request assistance  - Assess pain using appropriate pain scale  - Administer analgesics based on type and severity of pain and evaluate response  - Implement non-pharmacological measures as appropriate and evaluate response  - Consider cultural and social influences on pain and pain management  - Notify physician/advanced practitioner if interventions unsuccessful or patient reports new pain  Outcome: Progressing     Problem: INFECTION - ADULT  Goal: Absence or prevention of progression during hospitalization  Description  INTERVENTIONS:  - Assess and monitor for signs and symptoms of infection  - Monitor lab/diagnostic results  - Monitor all insertion sites, i e  indwelling lines, tubes, and drains  - Monitor endotracheal if appropriate and nasal secretions for changes in amount and color  - [place] appropriate cooling/warming therapies per order  - Administer medications as ordered  - Instruct and encourage patient and family to use good hand hygiene technique  - Identify and instruct in appropriate isolation precautions for identified infection/condition  Outcome: Progressing     Problem: SAFETY ADULT  Goal: Maintain or return to baseline ADL function  Description  INTERVENTIONS:  -  Assess patient's ability to carry out ADLs; assess patient's baseline for ADL function and identify physical deficits which impact ability to perform ADLs (bathing, care of mouth/teeth, toileting, grooming, dressing, etc )  - Assess/evaluate cause of self-care deficits   - Assess range of motion  - Assess patient's mobility; develop plan if impaired  - Assess patient's need for assistive devices and provide as appropriate  - Encourage maximum independence but intervene and supervise when necessary  - Involve family in performance of ADLs  - Assess for home care needs following discharge   - Consider OT consult to assist with ADL evaluation and planning for discharge  - Provide patient education as appropriate  Outcome: Progressing  Goal: Maintain or return mobility status to optimal level  Description  INTERVENTIONS:  - Assess patient's baseline mobility status (ambulation, transfers, stairs, etc )    - Identify cognitive and physical deficits and behaviors that affect mobility  - Identify mobility aids required to assist with transfers and/or ambulation (gait belt, sit-to-stand, lift, walker, cane, etc )  - [place] fall precautions as indicated by assessment  - Record patient progress and toleration of activity level on Mobility SBAR; progress patient to next Phase/Stage  - Instruct patient to call for assistance with activity based on assessment  - Consider rehabilitation consult to assist with strengthening/weightbearing, etc   Outcome: Progressing     Problem: DISCHARGE PLANNING  Goal: Discharge to home or other facility with appropriate resources  Description  INTERVENTIONS:  - Identify barriers to discharge w/patient and caregiver  - Arrange for needed discharge resources and transportation as appropriate  - Identify discharge learning needs (meds, wound care, etc )  - Arrange for interpretive services to assist at discharge as needed  - Refer to Case Management Department for coordinating discharge planning if the patient needs post-hospital services based on physician/advanced practitioner order or complex needs related to functional status, cognitive ability, or social support system  Outcome: Progressing     Problem: Knowledge Deficit  Goal: Patient/family/caregiver demonstrates understanding of disease process, treatment plan, medications, and discharge instructions  Description  Complete learning assessment and assess knowledge base  Interventions:  - Provide teaching at level of understanding  - Provide teaching via preferred learning methods  Outcome: Progressing     Problem: Prexisting or High Potential for Compromised Skin Integrity  Goal: Skin integrity is maintained or improved  Description  INTERVENTIONS:  - Identify patients at risk for skin breakdown  - Assess and monitor skin integrity  - Assess and monitor nutrition and hydration status  - Monitor labs   - Assess for incontinence   - Turn and reposition patient  - Assist with mobility/ambulation  - Relieve pressure over bony prominences  - Avoid friction and shearing  - Provide appropriate hygiene as needed including keeping skin clean and dry  - Evaluate need for skin moisturizer/barrier cream  - Collaborate with interdisciplinary team   - Patient/family teaching  - Consider wound care consult   Outcome: Progressing     Problem: Nutrition/Hydration-ADULT  Goal: Nutrient/Hydration intake appropriate for improving, restoring or maintaining nutritional needs  Description  Monitor and assess patient's nutrition/hydration status for malnutrition  Collaborate with interdisciplinary team and initiate plan and interventions as ordered  Monitor patient's weight and dietary intake as ordered or per policy  Utilize nutrition screening tool and intervene as necessary  Determine patient's food preferences and provide high-protein, high-caloric foods as appropriate       INTERVENTIONS:  - Monitor oral intake, urinary output, labs, and treatment plans  - Assess nutrition and hydration status and recommend course of action  - Evaluate amount of meals eaten  - Assist patient with eating if necessary   - Allow adequate time for meals  - Recommend/ encourage appropriate diets, oral nutritional supplements, and vitamin/mineral supplements  - Order, calculate, and assess calorie counts as needed  - Recommend, monitor, and adjust tube feedings and TPN/PPN based on assessed needs  - Assess need for intravenous fluids  - Provide specific nutrition/hydration education as appropriate  - Include patient/family/caregiver in decisions related to nutrition  Outcome: Progressing

## 2019-10-21 ENCOUNTER — TRANSITIONAL CARE MANAGEMENT (OUTPATIENT)
Dept: FAMILY MEDICINE CLINIC | Facility: CLINIC | Age: 59
End: 2019-10-21

## 2019-10-28 ENCOUNTER — TELEPHONE (OUTPATIENT)
Dept: SURGERY | Facility: CLINIC | Age: 59
End: 2019-10-28

## 2019-10-28 NOTE — TELEPHONE ENCOUNTER
Called Betty Pennington PA-C to ask permission to clear the patient to return to work  Patient was questioned as to his symptoms and conditions and a clearance letter was generated by me and cosigned

## 2022-06-14 ENCOUNTER — OFFICE VISIT (OUTPATIENT)
Dept: URGENT CARE | Facility: CLINIC | Age: 62
End: 2022-06-14
Payer: COMMERCIAL

## 2022-06-14 VITALS — RESPIRATION RATE: 16 BRPM | TEMPERATURE: 98.9 F | OXYGEN SATURATION: 98 % | HEART RATE: 119 BPM

## 2022-06-14 DIAGNOSIS — U07.1 COVID-19: Primary | ICD-10-CM

## 2022-06-14 LAB
SARS-COV-2 AG UPPER RESP QL IA: POSITIVE
VALID CONTROL: ABNORMAL

## 2022-06-14 PROCEDURE — 87811 SARS-COV-2 COVID19 W/OPTIC: CPT | Performed by: PHYSICIAN ASSISTANT

## 2022-06-14 PROCEDURE — 99213 OFFICE O/P EST LOW 20 MIN: CPT | Performed by: PHYSICIAN ASSISTANT

## 2022-06-14 RX ORDER — MELOXICAM 7.5 MG/1
TABLET ORAL
COMMUNITY
Start: 2022-05-27

## 2022-06-14 NOTE — PATIENT INSTRUCTIONS
Rapid COVID test positive  Call your primary care provider today and schedule follow-up appointment in the next 2-3 days  Any new or worsening symptoms develop proceed to the ER

## 2022-06-14 NOTE — PROGRESS NOTES
[facility]        NAME: Taina Call is a 58 y o  male  : 1960    MRN: [id number]  DATE: 2022  TIME: 4:52 PM    Assessment and Plan   COVID-19 [U07 1]  1  COVID-19  Poct Covid 19 Rapid Antigen Test         Patient Instructions   Patient Instructions   Rapid COVID test positive  Call your primary care provider today and schedule follow-up appointment in the next 2-3 days  Any new or worsening symptoms develop proceed to the ER  Follow up with PCP in 3-5 days  Proceed to  ER if symptoms worsen  Chief Complaint     Chief Complaint   Patient presents with    Cough     Positive exposure to covid  Cough started today, some congestion, runny nose  Feeling warm  Covid vaccines taken  Not taking any meds for cough  History of Present Illness       Patient presents with a cough and congestion starting today  Felt a little warm as well  Patient's girlfriend has COVID and he had a positive exposure to air  Denies fevers, chest pains, shortness of breath, difficulty breathing, confusion, lethargy  Denies any medical conditions or any problems with his immune system  Review of Systems   Review of Systems   Constitutional: Negative for chills, fatigue and fever  HENT: Positive for congestion and rhinorrhea  Negative for ear discharge, ear pain, postnasal drip, sinus pressure, sinus pain and sore throat  Respiratory: Positive for cough  Negative for chest tightness, shortness of breath and wheezing  Cardiovascular: Negative for chest pain and palpitations  Musculoskeletal: Negative for arthralgias and myalgias  Neurological: Negative for weakness  Psychiatric/Behavioral: Negative for confusion           Current Medications       Current Outpatient Medications:     acetaminophen (TYLENOL) 325 mg tablet, Take 2 tablets (650 mg total) by mouth every 6 (six) hours as needed for mild pain, Disp: 30 tablet, Rfl: 0    lisinopril (ZESTRIL) 20 mg tablet, Take 1 tablet by mouth daily, Disp: , Rfl:     meloxicam (MOBIC) 7 5 mg tablet, , Disp: , Rfl:     pravastatin (PRAVACHOL) 40 mg tablet, Take 1 tablet by mouth, Disp: , Rfl:     famotidine (PEPCID) 20 mg tablet, Take 1 tablet (20 mg total) by mouth 2 (two) times a day, Disp: 60 tablet, Rfl: 0    Current Allergies     Allergies as of 06/14/2022    (No Known Allergies)            The following portions of the patient's history were reviewed and updated as appropriate: allergies, current medications, past family history, past medical history, past social history, past surgical history and problem list      Past Medical History:   Diagnosis Date    Hyperlipidemia     Hypertension        Past Surgical History:   Procedure Laterality Date    CHOLECYSTECTOMY         History reviewed  No pertinent family history  Medications have been verified  Objective   Pulse (!) 119   Temp 98 9 °F (37 2 °C)   Resp 16   SpO2 98%        Physical Exam     Physical Exam  Constitutional:       General: He is not in acute distress  Appearance: Normal appearance  He is not ill-appearing or diaphoretic  HENT:      Right Ear: Tympanic membrane, ear canal and external ear normal       Left Ear: Tympanic membrane, ear canal and external ear normal       Nose: Nose normal       Mouth/Throat:      Mouth: Mucous membranes are moist       Pharynx: Oropharynx is clear  Eyes:      Conjunctiva/sclera: Conjunctivae normal    Cardiovascular:      Rate and Rhythm: Normal rate and regular rhythm  Heart sounds: Normal heart sounds  Pulmonary:      Effort: Pulmonary effort is normal       Breath sounds: Normal breath sounds  Skin:     General: Skin is warm and dry  Neurological:      Mental Status: He is alert     Psychiatric:         Mood and Affect: Mood normal          Behavior: Behavior normal

## 2022-06-14 NOTE — LETTER
June 14, 2022     Patient: Clarisa Chappell  YOB: 1960  Date of Visit: 6/14/2022      To Whom it May Concern:    Zoila Mathur is under my professional care  Nancy Finch was seen in my office on 6/14/2022  Nancy Finch may return to work on 06/20/2022 as long as he has been fever free for the last 24 hours and symptoms are resolving  He must wear a mask for 5 days after returning  Please follow CDC guidelines for returning to work  If you have any questions or concerns, please don't hesitate to call           Sincerely,          LIAN Alvares        CC: No Recipients

## 2025-04-03 ENCOUNTER — OFFICE VISIT (OUTPATIENT)
Dept: URGENT CARE | Facility: CLINIC | Age: 65
End: 2025-04-03
Payer: COMMERCIAL

## 2025-04-03 VITALS
OXYGEN SATURATION: 98 % | WEIGHT: 176 LBS | RESPIRATION RATE: 16 BRPM | TEMPERATURE: 98.4 F | DIASTOLIC BLOOD PRESSURE: 86 MMHG | SYSTOLIC BLOOD PRESSURE: 156 MMHG | BODY MASS INDEX: 25.99 KG/M2 | HEART RATE: 72 BPM

## 2025-04-03 DIAGNOSIS — B96.89 ACUTE BACTERIAL RHINOSINUSITIS: Primary | ICD-10-CM

## 2025-04-03 DIAGNOSIS — R42 DIZZINESS: ICD-10-CM

## 2025-04-03 DIAGNOSIS — J01.90 ACUTE BACTERIAL RHINOSINUSITIS: Primary | ICD-10-CM

## 2025-04-03 LAB
ATRIAL RATE: 52 BPM
GLUCOSE SERPL-MCNC: 69 MG/DL (ref 65–140)
P AXIS: 44 DEGREES
PR INTERVAL: 144 MS
QRS AXIS: 32 DEGREES
QRSD INTERVAL: 80 MS
QT INTERVAL: 412 MS
QTC INTERVAL: 384 MS
T WAVE AXIS: 32 DEGREES
VENTRICULAR RATE: 52 BPM

## 2025-04-03 PROCEDURE — G0382 LEV 3 HOSP TYPE B ED VISIT: HCPCS | Performed by: PHYSICAL MEDICINE & REHABILITATION

## 2025-04-03 PROCEDURE — 93005 ELECTROCARDIOGRAM TRACING: CPT | Performed by: PHYSICAL MEDICINE & REHABILITATION

## 2025-04-03 PROCEDURE — 93010 ELECTROCARDIOGRAM REPORT: CPT | Performed by: INTERNAL MEDICINE

## 2025-04-03 PROCEDURE — S9083 URGENT CARE CENTER GLOBAL: HCPCS | Performed by: PHYSICAL MEDICINE & REHABILITATION

## 2025-04-03 PROCEDURE — 82948 REAGENT STRIP/BLOOD GLUCOSE: CPT | Performed by: PHYSICAL MEDICINE & REHABILITATION

## 2025-04-03 NOTE — LETTER
April 3, 2025     Patient: Cuong Corea   YOB: 1960   Date of Visit: 4/3/2025       To Whom it May Concern:    Cuong Corea was seen in my clinic on 4/3/2025. He may return to work on 4/3/25 .    If you have any questions or concerns, please don't hesitate to call.         Sincerely,          Ruth Witt PA-C        CC: No Recipients

## 2025-04-03 NOTE — PATIENT INSTRUCTIONS
Sinus bradycardia- re-check EKG, cardiology follow up   Trial antibiotics for acute upper respiratory infection  If symptoms worsen/persists, proceed to the ER

## 2025-04-03 NOTE — PROGRESS NOTES
[facility]        NAME: Cuong Corea is a 64 y.o. male  : 1960    MRN: [id number]  DATE: April 3, 2025  TIME: 9:41 AM    Assessment and Plan   Dizziness [R42]  1. Dizziness  ECG 12 lead    Fingerstick Glucose (POCT)        Orthostatic BP checked: within normal limits  EKG: Sinus bradycardia, otherwise unremarkable   -No previous EKG to compare will refer to Cardiology for further workup, repeat EKG.   Blood glucose: 69  Physical examination is unremarkable  Patient with upper respiratory infection symptoms. Will treat with antibiotics given symptom length. This can be a potential source of dizziness.  Patient declined rapid covid testing at this time.  Another potential source of dizziness could be dehydration, did encourage to increase water intake.  Patient declining Dizziness at this time but did encourage patient to follow up in the ER should symptoms resume/worsen.  Patient would like to return to work. Did advise again, to proceed to ER if symptoms start again. May be beneficial to have patient surrounded by co-workers to monitor.  No signs of neurological deficit, no suspicion for acute CVA/TIA at this time.       Patient Instructions       Follow up with PCP in 3-5 days.  Proceed to  ER if symptoms worsen.    If tests are performed, our office will contact you with results only if changes need to made to the care plan discussed with you at the visit. [facility]    Chief Complaint     Chief Complaint   Patient presents with    Dizziness     Pt was cleaning at work today and started feeling light headed, sweating. No CP, no SOB. Pt states he had cold sx for 2-3 weeks. Took nyquil. Had breakfast at 5:30 this morning.          History of Present Illness       Patient is a 64 year old male with a PMH significant for Hypertension and Hyperlipidemia. He notes he was cleaning at work this morning, 4/3/25, when he developed light-headedness,  diaphoresis. He denies chest pain, shortness of breath. He admits he ate breakfast around 0530. He does admit to cold-like symptoms that have been ongoing for approximately 2-3 weeks. He did take Nyquil for these symptoms.   Patient denies current dizziness.    Dizziness  Associated symptoms include diaphoresis. Pertinent negatives include no chest pain, chills or fever.       Review of Systems   Review of Systems   Constitutional:  Positive for diaphoresis. Negative for chills and fever.   HENT: Negative.     Respiratory:  Negative for shortness of breath.    Cardiovascular:  Negative for chest pain.   Gastrointestinal: Negative.    Musculoskeletal: Negative.    Neurological:  Positive for dizziness.         Current Medications       Current Outpatient Medications:     acetaminophen (TYLENOL) 325 mg tablet, Take 2 tablets (650 mg total) by mouth every 6 (six) hours as needed for mild pain, Disp: 30 tablet, Rfl: 0    famotidine (PEPCID) 20 mg tablet, Take 1 tablet (20 mg total) by mouth 2 (two) times a day, Disp: 60 tablet, Rfl: 0    lisinopril (ZESTRIL) 20 mg tablet, Take 1 tablet by mouth daily, Disp: , Rfl:     meloxicam (MOBIC) 7.5 mg tablet, , Disp: , Rfl:     pravastatin (PRAVACHOL) 40 mg tablet, Take 1 tablet by mouth, Disp: , Rfl:     Current Allergies     Allergies as of 04/03/2025    (No Known Allergies)            The following portions of the patient's history were reviewed and updated as appropriate: allergies, current medications, past family history, past medical history, past social history, past surgical history and problem list.     Past Medical History:   Diagnosis Date    Hyperlipidemia     Hypertension        Past Surgical History:   Procedure Laterality Date    CHOLECYSTECTOMY         History reviewed. No pertinent family history.      Medications have been verified.        Objective   /86   Pulse 72   Temp 98.4 °F (36.9 °C)   Resp 16   Wt 79.8 kg (176 lb)   SpO2 98%   BMI 25.99  kg/m²        Physical Exam     Physical Exam  Vitals reviewed.   Constitutional:       General: He is not in acute distress.     Appearance: Normal appearance. He is not ill-appearing, toxic-appearing or diaphoretic.   HENT:      Right Ear: Tympanic membrane normal.      Left Ear: Tympanic membrane normal.      Nose: Congestion present.      Mouth/Throat:      Mouth: Mucous membranes are moist.      Pharynx: Oropharynx is clear.   Eyes:      Extraocular Movements: Extraocular movements intact.      Conjunctiva/sclera: Conjunctivae normal.      Pupils: Pupils are equal, round, and reactive to light.   Cardiovascular:      Rate and Rhythm: Normal rate and regular rhythm.      Pulses: Normal pulses.      Heart sounds: Normal heart sounds.   Pulmonary:      Effort: Pulmonary effort is normal. No respiratory distress.      Breath sounds: Normal breath sounds. No wheezing, rhonchi or rales.   Abdominal:      General: Bowel sounds are normal. There is no distension.      Palpations: Abdomen is soft.      Tenderness: There is no abdominal tenderness.   Skin:     Coloration: Skin is not pale.   Neurological:      General: No focal deficit present.      Mental Status: He is alert and oriented to person, place, and time.      Motor: No weakness.      Coordination: Coordination normal.      Gait: Gait normal.   Psychiatric:         Mood and Affect: Mood normal.         Behavior: Behavior normal.

## 2025-04-04 ENCOUNTER — APPOINTMENT (OUTPATIENT)
Dept: LAB | Facility: CLINIC | Age: 65
End: 2025-04-04
Payer: COMMERCIAL

## 2025-04-04 DIAGNOSIS — I10 ESSENTIAL HYPERTENSION, MALIGNANT: ICD-10-CM

## 2025-04-04 DIAGNOSIS — E78.5 HYPERLIPIDEMIA, UNSPECIFIED HYPERLIPIDEMIA TYPE: ICD-10-CM

## 2025-04-04 LAB
ALBUMIN SERPL BCG-MCNC: 4.5 G/DL (ref 3.5–5)
ALP SERPL-CCNC: 115 U/L (ref 34–104)
ALT SERPL W P-5'-P-CCNC: 18 U/L (ref 7–52)
ANION GAP SERPL CALCULATED.3IONS-SCNC: 7 MMOL/L (ref 4–13)
AST SERPL W P-5'-P-CCNC: 17 U/L (ref 13–39)
BILIRUB SERPL-MCNC: 0.7 MG/DL (ref 0.2–1)
BUN SERPL-MCNC: 20 MG/DL (ref 5–25)
CALCIUM SERPL-MCNC: 9.5 MG/DL (ref 8.4–10.2)
CHLORIDE SERPL-SCNC: 106 MMOL/L (ref 96–108)
CHOLEST SERPL-MCNC: 149 MG/DL (ref ?–200)
CO2 SERPL-SCNC: 27 MMOL/L (ref 21–32)
CREAT SERPL-MCNC: 1.16 MG/DL (ref 0.6–1.3)
GFR SERPL CREATININE-BSD FRML MDRD: 66 ML/MIN/1.73SQ M
GLUCOSE P FAST SERPL-MCNC: 71 MG/DL (ref 65–99)
HDLC SERPL-MCNC: 41 MG/DL
LDLC SERPL CALC-MCNC: 93 MG/DL (ref 0–100)
NONHDLC SERPL-MCNC: 108 MG/DL
POTASSIUM SERPL-SCNC: 5.2 MMOL/L (ref 3.5–5.3)
PROT SERPL-MCNC: 7.5 G/DL (ref 6.4–8.4)
SODIUM SERPL-SCNC: 140 MMOL/L (ref 135–147)
TRIGL SERPL-MCNC: 74 MG/DL (ref ?–150)

## 2025-04-04 PROCEDURE — 36415 COLL VENOUS BLD VENIPUNCTURE: CPT

## 2025-04-04 PROCEDURE — 80053 COMPREHEN METABOLIC PANEL: CPT

## 2025-04-04 PROCEDURE — 80061 LIPID PANEL: CPT
